# Patient Record
Sex: FEMALE | Race: WHITE | HISPANIC OR LATINO | Employment: FULL TIME | URBAN - METROPOLITAN AREA
[De-identification: names, ages, dates, MRNs, and addresses within clinical notes are randomized per-mention and may not be internally consistent; named-entity substitution may affect disease eponyms.]

---

## 2019-03-29 ENCOUNTER — NON-PROVIDER VISIT (OUTPATIENT)
Dept: HEALTH INFORMATION MANAGEMENT | Facility: MEDICAL CENTER | Age: 29
End: 2019-03-29
Payer: COMMERCIAL

## 2019-03-29 VITALS — HEIGHT: 68 IN | WEIGHT: 214.4 LBS | BODY MASS INDEX: 32.49 KG/M2

## 2019-03-29 DIAGNOSIS — O24.419 GESTATIONAL DIABETES MELLITUS (GDM) IN THIRD TRIMESTER, GESTATIONAL DIABETES METHOD OF CONTROL UNSPECIFIED: ICD-10-CM

## 2019-03-29 PROCEDURE — G0109 DIAB MANAGE TRN IND/GROUP: HCPCS | Performed by: INTERNAL MEDICINE

## 2019-03-29 NOTE — LETTER
2019                   Re: Marycarmen Retana     1990         [id number]       Rabia Cervantes M.D.  [address] [place] [address]      Dear :Rabia Cervantes M.D.    On 3/29/2019, your patient Marycarmen Retana, received 1 hour of nurse training from the [facility] at [facility] for management of her gestational diabetes.  Her Estimated Date of Delivery: 19 with scheduled  for 19.  We taught the following subjects:    Introduction to gestational diabetes, benefits and responsibilities of patient, physiology of diabetes and the diease process, benefits of blood glucose monitoring and record keeping, medication action and possible side effects, hypoglycemia, sick day management, exercise, and stress reduction.       Nurse assessment / Education:    Comments:    Edema:no      Weight:Weight: 97.3 kg (214 lb 6.4 oz)         Complaints:yes - History of Gastric Bypass and having twins.      Pathophysiology of diabetes in pregnancy    Discuss  potential maternal and fetal complications in pregnancy with diabetes.     Importance of blood glucose monitoring   Proper testing technique using a One Touch Verio Flex meter.    At 2:20 pm, the meter read 79, which was 7.5 hours after eating.  Testing: fasting and one hour after meals,  expected ranges and rationale for strict control.     Ketone test today:no       Recognition and treatment of hypoglycemia.     Insulin taught: No  Insulin briefly dicussed at this time.    Should patient require insulin later in pregnancy, she would need further education.     Reviewed fetal kick counts and other tests to determine fetal well-being  Discuss benefits and risks of exercise in pregnancy  Discuss when to call Doctor  Discuss sick day care        Patient/caregiver appeared to understand the content as demonstrated by appropriate questions.     Marycarmen Retana was encouraged to discuss this further with you.    Hopefully this will help in  your management of her care.  If we can be of further assistance, please feel free to call.    Thank you for the referral.    Sincerely,      Alayna Lindsay RN CDE  GDM CLASS  Certified Diabetes Educator

## 2019-03-29 NOTE — PROGRESS NOTES
Marycarmen received a 2000 calorie meal plan (based on 20 kcal/kg of current weight; with consideration for bariatric surgery, multigravid pregnancy) consisting of 3 meals and 3 snacks (see media for copy of meal plan). Calories may need to be adjusted based on weight gain and how much she can tolerated given hx of bariatric surgery.   Pt states she eats small frequent meals already and will continue to do so. However today hasnt eaten since breakfast.     Pt's prepregnancy weight was: 198lb. She has gained 16lb so far in this pregnancy. Recommended weight gain is 25-42lb total.   Pt had gastric bypass surgery; highest weight was 284lb; lost ~100lb before she got pregnant.     Pt was educated on carbohydrate containing foods vs non carbohydrate containing foods, the importance of small frequent meals, limiting carbohydrate to 1 serving in the morning, no fruit before noon/12pm, and avoiding all concentrated sweets for the remainder of her pregnancy. Explained the importance of not going >4hrs btwn eating during the day and no longer than 10hours overnight. Patient agrees to follow the meal plan and guidelines provided.

## 2019-03-29 NOTE — LETTER
March 29, 2019             Re: Marycarmen Retana     1990         Rabia Cervantes M.D.  [address] [place] [address]      Dear :Rabia Cervantes M.D.    On 3/29/2019, your patient Marycarmen Retana, received 1 hours of nutrition training from the [facility] at [facility] for management of her gestational diabetes.  Her EDC is Estimated Date of Delivery: 6/4/19.  We taught the following subjects:    Patient was provided with a 2000 calorie meal plan with 3 meals and 3 snacks based on 20kcal/kg w/ consideration for bariatric surgery and twins. This will likely need to be adjusted based on weight gain and tolerance to food volume.   Pt was likely eating 1200kcal diet after surgery so it may be difficult for her to consume adequate calories s/p bariatric surgery. Her weight gain however has been adequate thus far in her pregnancy.     Meal plan contains 195 grams of carbohydrates per day.   Emphasis was placed on protein and small frequent meals.  Discussed the importance of meal planning in diabetes management during pregnancy  Importance of consistent timing of meals and snacks and agreed upon times  Avoidance of simple carbohydrates  Metabolism of food components relating to pregnancy  Identification of foods in food groups  Patient demonstrates adequate ability to utilize meal planning manual for reference  Plan 3 meals and 3 snacks with 90% accuracy  Review basic principles of eating out  Reviewed precautions with artificial sweeteners    Comments:  Marycarmen agreed to follow the meal plan and to eat at the times agreed upon.  She will check blood glucose 4 times a day and record the values in her log book.    Marycarmen Retana was encouraged to discuss this further with you.    Hopefully this will help in your management of her care.  If we can be of further assistance, please feel free to call.    Thank you for the referral.  Sincerely,  Aleyda Thompson, RD, LD, CDE  [phone]

## 2019-03-29 NOTE — PROGRESS NOTES
Marycarmen came to the Gestational Diabetes.  She states her  has Type 1 diabetes.  She has a history of Gastric Bypass and lost 100 pounds.  She is having twins.  She was not able to do the Glucose Tolerance Test due to the Gastric Bypass.  She has a scheduled  for 19.  She was supplied an One Touch Verio Flex meter.  She was able to do a return demonstration without difficulty. She will keep walking and stay well hydrated with water. Her meal plan is scanned into Media and her Doctor Letters with what was taught can be found under the Letters tab.

## 2019-04-17 ENCOUNTER — HOSPITAL ENCOUNTER (OUTPATIENT)
Facility: MEDICAL CENTER | Age: 29
End: 2019-04-17
Attending: OBSTETRICS & GYNECOLOGY | Admitting: OBSTETRICS & GYNECOLOGY
Payer: COMMERCIAL

## 2019-04-17 VITALS
DIASTOLIC BLOOD PRESSURE: 69 MMHG | SYSTOLIC BLOOD PRESSURE: 106 MMHG | HEART RATE: 88 BPM | RESPIRATION RATE: 18 BRPM | TEMPERATURE: 98.5 F | HEIGHT: 68 IN | BODY MASS INDEX: 32.58 KG/M2 | WEIGHT: 215 LBS

## 2019-04-17 LAB
APPEARANCE UR: CLEAR
COLOR UR AUTO: YELLOW
GLUCOSE UR QL STRIP.AUTO: NEGATIVE MG/DL
KETONES UR QL STRIP.AUTO: NEGATIVE MG/DL
LEUKOCYTE ESTERASE UR QL STRIP.AUTO: ABNORMAL
NITRITE UR QL STRIP.AUTO: NEGATIVE
PH UR STRIP.AUTO: 6 [PH]
PROT UR QL STRIP: NEGATIVE MG/DL
RBC UR QL AUTO: NEGATIVE
SP GR UR: <=1.005

## 2019-04-17 PROCEDURE — 59025 FETAL NON-STRESS TEST: CPT

## 2019-04-17 PROCEDURE — 81002 URINALYSIS NONAUTO W/O SCOPE: CPT

## 2019-04-18 NOTE — DISCHARGE INSTRUCTIONS
General Instructions:  · If you think you are in labor, time contractions (lying on your left side) from the beginning of one contraction to the beginning of the next contraction for at least one hour.  · Increase fluid intake: you should consume 10-12 8 oz glasses of non-caffeinated fluid per day.  · Report any pressure or burning on urination to your physician.  · Monitor fetal movement: If you notice an absence or decrease in fetal movement, drink a large glass of water and rest on your side.  If there is no increase in movement, call your physician or go to the hospital for further evaluation.  · Report any sudden, sharp abdominal pain.  · Report any bleeding.  Spotting or pinkish discharge is normal after vaginal exam.  You may also spot after sexual intercourse.    Pre-term Labor (<37 weeks):  Call your physician or return to the hospital if:  · You have painless regular contractions more than 4 in one hour.  · Your water breaks (remember time and color).  · You have menstrual-like cramps, a low dull backache or pressure in your pelvis or back.  · Your baby does not move enough to complete the daily kick count (10 movements in 2 hours).  · Your baby moves much less often than on the days before or you have not felt your baby move all day.  · Please review the MEDICATION LIST section of your AFTER VISIT SUMMARY document.  · Take your medication as prescribed    High Blood Pressure:  · Rest on your right or left side.  · Report any severe headaches, dizziness, blurred vision or spots before your eyes.  · Report excessive swelling of your hands, face or feet.  · Report epigastric pain (upper abdominal pain)      Other Instructions:  Please carefully review your entire AFTER VISIT SUMMARY document for all discharge instructions.

## 2019-04-18 NOTE — PROGRESS NOTES
" EDC 19=33.1 twin gestation.     182 pt arrived to L&D after calling Dr. Cervantes's office saying that she \"has bad swelling in her feet that is new and has a mild HA, pt states she is pregnant with twins and due in \". Pt taken to LDA3, EFM (X2) and TOCO applied, POC discussed, all questions answered. Initial BP WNL. Pt reprots +FM, denies leaking of vaginal fluid or blood, denies feeling cramping or contractions.   184 Dr. Matos in department, updated on pt status, orders received to run PIH labs if protein in urine or increase in BP, will  continue with POC.   bedside report given to NOC RN, assumed care, POC discussed, all questions answered. VSS.  "

## 2019-04-18 NOTE — PROGRESS NOTES
Report received from MACIE Rasmussen RN. POC discussed.     Pt is a ; YOLANDA of 6/; making her 33w1d. Pregnant with twin gestation. Pt here for generalized swelling in her lower extremeties. Denies HA, Blurry vision, RUQ pain, UCs, VB and reports +FM.     POC to collected POC UA. Cycle BPs and update MD. BPs WNL, UA collected (see results). Dr Matos updated. Orders to discharge pt home.     General discharge instruction, PTL precautions, high blood pressure warning signs and follow up discussed with pt and FOB. All questions answered at this time. Pt signed discharge paperwork and ambulated out in stable condition with FOB at side.

## 2019-04-22 ENCOUNTER — APPOINTMENT (OUTPATIENT)
Dept: RADIOLOGY | Facility: MEDICAL CENTER | Age: 29
End: 2019-04-22
Attending: OBSTETRICS & GYNECOLOGY
Payer: COMMERCIAL

## 2019-04-22 ENCOUNTER — ANESTHESIA EVENT (OUTPATIENT)
Dept: OBGYN | Facility: MEDICAL CENTER | Age: 29
End: 2019-04-22
Payer: COMMERCIAL

## 2019-04-22 ENCOUNTER — HOSPITAL ENCOUNTER (INPATIENT)
Facility: MEDICAL CENTER | Age: 29
LOS: 4 days | End: 2019-04-26
Attending: OBSTETRICS & GYNECOLOGY | Admitting: OBSTETRICS & GYNECOLOGY
Payer: COMMERCIAL

## 2019-04-22 LAB
ABO GROUP BLD: NORMAL
ANISOCYTOSIS BLD QL SMEAR: ABNORMAL
BASOPHILS # BLD AUTO: 0.5 % (ref 0–1.8)
BASOPHILS # BLD: 0.05 K/UL (ref 0–0.12)
COMMENT 1642: NORMAL
EOSINOPHIL # BLD AUTO: 0.03 K/UL (ref 0–0.51)
EOSINOPHIL NFR BLD: 0.3 % (ref 0–6.9)
ERYTHROCYTE [DISTWIDTH] IN BLOOD BY AUTOMATED COUNT: 49.9 FL (ref 35.9–50)
ERYTHROCYTE [DISTWIDTH] IN BLOOD BY AUTOMATED COUNT: 50.6 FL (ref 35.9–50)
HCT VFR BLD AUTO: 31.1 % (ref 37–47)
HCT VFR BLD AUTO: 31.4 % (ref 37–47)
HGB BLD-MCNC: 9.3 G/DL (ref 12–16)
HGB BLD-MCNC: 9.3 G/DL (ref 12–16)
HOLDING TUBE BB 8507: NORMAL
IMM GRANULOCYTES # BLD AUTO: 0.05 K/UL (ref 0–0.11)
IMM GRANULOCYTES NFR BLD AUTO: 0.5 % (ref 0–0.9)
IMMUNE ROSETTING TEST 8505FMH: NORMAL
LG PLATELETS BLD QL SMEAR: NORMAL
LYMPHOCYTES # BLD AUTO: 2 K/UL (ref 1–4.8)
LYMPHOCYTES NFR BLD: 20.7 % (ref 22–41)
MCH RBC QN AUTO: 24 PG (ref 27–33)
MCH RBC QN AUTO: 24 PG (ref 27–33)
MCHC RBC AUTO-ENTMCNC: 29.6 G/DL (ref 33.6–35)
MCHC RBC AUTO-ENTMCNC: 29.9 G/DL (ref 33.6–35)
MCV RBC AUTO: 80.2 FL (ref 81.4–97.8)
MCV RBC AUTO: 81.1 FL (ref 81.4–97.8)
MICROCYTES BLD QL SMEAR: ABNORMAL
MONOCYTES # BLD AUTO: 0.4 K/UL (ref 0–0.85)
MONOCYTES NFR BLD AUTO: 4.1 % (ref 0–13.4)
MORPHOLOGY BLD-IMP: NORMAL
MORPHOLOGY BLD-IMP: NORMAL
NEUTROPHILS # BLD AUTO: 7.11 K/UL (ref 2–7.15)
NEUTROPHILS NFR BLD: 73.9 % (ref 44–72)
NRBC # BLD AUTO: 0 K/UL
NRBC BLD-RTO: 0 /100 WBC
NUMBER OF RH DOSES IND 8505RD: 1
OVALOCYTES BLD QL SMEAR: NORMAL
PLATELET # BLD AUTO: 194 K/UL (ref 164–446)
PLATELET # BLD AUTO: 204 K/UL (ref 164–446)
PLATELET BLD QL SMEAR: NORMAL
PMV BLD AUTO: 11.6 FL (ref 9–12.9)
PMV BLD AUTO: 11.6 FL (ref 9–12.9)
POIKILOCYTOSIS BLD QL SMEAR: NORMAL
RBC # BLD AUTO: 3.87 M/UL (ref 4.2–5.4)
RBC # BLD AUTO: 3.88 M/UL (ref 4.2–5.4)
RBC BLD AUTO: PRESENT
RH BLD: NORMAL
VARIANT LYMPHS BLD QL SMEAR: NORMAL
WBC # BLD AUTO: 10.7 K/UL (ref 4.8–10.8)
WBC # BLD AUTO: 9.6 K/UL (ref 4.8–10.8)

## 2019-04-22 PROCEDURE — 700102 HCHG RX REV CODE 250 W/ 637 OVERRIDE(OP)

## 2019-04-22 PROCEDURE — 85027 COMPLETE CBC AUTOMATED: CPT

## 2019-04-22 PROCEDURE — 87150 DNA/RNA AMPLIFIED PROBE: CPT

## 2019-04-22 PROCEDURE — 700102 HCHG RX REV CODE 250 W/ 637 OVERRIDE(OP): Performed by: ANESTHESIOLOGY

## 2019-04-22 PROCEDURE — 76815 OB US LIMITED FETUS(S): CPT

## 2019-04-22 PROCEDURE — 85461 HEMOGLOBIN FETAL: CPT

## 2019-04-22 PROCEDURE — 305385 HCHG SURGICAL SERVICES 1/4 HOUR: Performed by: OBSTETRICS & GYNECOLOGY

## 2019-04-22 PROCEDURE — 700111 HCHG RX REV CODE 636 W/ 250 OVERRIDE (IP): Performed by: ANESTHESIOLOGY

## 2019-04-22 PROCEDURE — 86900 BLOOD TYPING SEROLOGIC ABO: CPT

## 2019-04-22 PROCEDURE — A9270 NON-COVERED ITEM OR SERVICE: HCPCS | Performed by: OBSTETRICS & GYNECOLOGY

## 2019-04-22 PROCEDURE — 700105 HCHG RX REV CODE 258: Performed by: ANESTHESIOLOGY

## 2019-04-22 PROCEDURE — 700111 HCHG RX REV CODE 636 W/ 250 OVERRIDE (IP)

## 2019-04-22 PROCEDURE — 88307 TISSUE EXAM BY PATHOLOGIST: CPT | Mod: 59

## 2019-04-22 PROCEDURE — 700101 HCHG RX REV CODE 250: Performed by: ANESTHESIOLOGY

## 2019-04-22 PROCEDURE — 304966 HCHG RECOVERY SVSC TIME ADDL 1/2 HR: Performed by: OBSTETRICS & GYNECOLOGY

## 2019-04-22 PROCEDURE — 59514 CESAREAN DELIVERY ONLY: CPT

## 2019-04-22 PROCEDURE — 86901 BLOOD TYPING SEROLOGIC RH(D): CPT

## 2019-04-22 PROCEDURE — 85025 COMPLETE CBC W/AUTO DIFF WBC: CPT

## 2019-04-22 PROCEDURE — 36415 COLL VENOUS BLD VENIPUNCTURE: CPT

## 2019-04-22 PROCEDURE — 306828 HCHG ANES-TIME GENERAL: Performed by: OBSTETRICS & GYNECOLOGY

## 2019-04-22 PROCEDURE — A9270 NON-COVERED ITEM OR SERVICE: HCPCS | Performed by: ANESTHESIOLOGY

## 2019-04-22 PROCEDURE — 304964 HCHG RECOVERY ROOM TIME 1HR: Performed by: OBSTETRICS & GYNECOLOGY

## 2019-04-22 PROCEDURE — 700111 HCHG RX REV CODE 636 W/ 250 OVERRIDE (IP): Performed by: OBSTETRICS & GYNECOLOGY

## 2019-04-22 PROCEDURE — 3E0334Z INTRODUCTION OF SERUM, TOXOID AND VACCINE INTO PERIPHERAL VEIN, PERCUTANEOUS APPROACH: ICD-10-PCS | Performed by: OBSTETRICS & GYNECOLOGY

## 2019-04-22 PROCEDURE — 700105 HCHG RX REV CODE 258

## 2019-04-22 PROCEDURE — 87081 CULTURE SCREEN ONLY: CPT

## 2019-04-22 PROCEDURE — 770002 HCHG ROOM/CARE - OB PRIVATE (112)

## 2019-04-22 PROCEDURE — 700102 HCHG RX REV CODE 250 W/ 637 OVERRIDE(OP): Performed by: OBSTETRICS & GYNECOLOGY

## 2019-04-22 RX ORDER — METOCLOPRAMIDE HYDROCHLORIDE 5 MG/ML
10 INJECTION INTRAMUSCULAR; INTRAVENOUS ONCE
Status: DISCONTINUED | OUTPATIENT
Start: 2019-04-22 | End: 2019-04-22 | Stop reason: HOSPADM

## 2019-04-22 RX ORDER — OXYCODONE HYDROCHLORIDE 5 MG/1
5 TABLET ORAL EVERY 4 HOURS PRN
Status: ACTIVE | OUTPATIENT
Start: 2019-04-22 | End: 2019-04-23

## 2019-04-22 RX ORDER — ACETAMINOPHEN 500 MG
1000 TABLET ORAL EVERY 6 HOURS
Status: COMPLETED | OUTPATIENT
Start: 2019-04-22 | End: 2019-04-23

## 2019-04-22 RX ORDER — ONDANSETRON 2 MG/ML
INJECTION INTRAMUSCULAR; INTRAVENOUS PRN
Status: DISCONTINUED | OUTPATIENT
Start: 2019-04-22 | End: 2019-04-22 | Stop reason: SURG

## 2019-04-22 RX ORDER — CITRIC ACID/SODIUM CITRATE 334-500MG
SOLUTION, ORAL ORAL
Status: COMPLETED
Start: 2019-04-22 | End: 2019-04-22

## 2019-04-22 RX ORDER — MORPHINE SULFATE 0.5 MG/ML
INJECTION, SOLUTION EPIDURAL; INTRATHECAL; INTRAVENOUS PRN
Status: DISCONTINUED | OUTPATIENT
Start: 2019-04-22 | End: 2019-04-22 | Stop reason: SURG

## 2019-04-22 RX ORDER — DEXAMETHASONE SODIUM PHOSPHATE 4 MG/ML
INJECTION, SOLUTION INTRA-ARTICULAR; INTRALESIONAL; INTRAMUSCULAR; INTRAVENOUS; SOFT TISSUE PRN
Status: DISCONTINUED | OUTPATIENT
Start: 2019-04-22 | End: 2019-04-22 | Stop reason: SURG

## 2019-04-22 RX ORDER — BUPIVACAINE HYDROCHLORIDE 7.5 MG/ML
INJECTION, SOLUTION INTRASPINAL PRN
Status: DISCONTINUED | OUTPATIENT
Start: 2019-04-22 | End: 2019-04-22 | Stop reason: SURG

## 2019-04-22 RX ORDER — ONDANSETRON 2 MG/ML
4 INJECTION INTRAMUSCULAR; INTRAVENOUS EVERY 6 HOURS PRN
Status: ACTIVE | OUTPATIENT
Start: 2019-04-22 | End: 2019-04-23

## 2019-04-22 RX ORDER — NIFEDIPINE 10 MG/1
20 CAPSULE ORAL
Status: DISCONTINUED | OUTPATIENT
Start: 2019-04-22 | End: 2019-04-22

## 2019-04-22 RX ORDER — DIPHENHYDRAMINE HYDROCHLORIDE 50 MG/ML
12.5 INJECTION INTRAMUSCULAR; INTRAVENOUS EVERY 6 HOURS PRN
Status: ACTIVE | OUTPATIENT
Start: 2019-04-22 | End: 2019-04-23

## 2019-04-22 RX ORDER — PENICILLIN G POTASSIUM 5000000 [IU]/1
5 INJECTION, POWDER, FOR SOLUTION INTRAMUSCULAR; INTRAVENOUS ONCE
Status: COMPLETED | OUTPATIENT
Start: 2019-04-22 | End: 2019-04-22

## 2019-04-22 RX ORDER — SODIUM CHLORIDE 9 MG/ML
INJECTION, SOLUTION INTRAVENOUS
Status: COMPLETED
Start: 2019-04-22 | End: 2019-04-22

## 2019-04-22 RX ORDER — METHYLERGONOVINE MALEATE 0.2 MG/ML
0.2 INJECTION INTRAVENOUS
Status: DISCONTINUED | OUTPATIENT
Start: 2019-04-22 | End: 2019-04-26 | Stop reason: HOSPADM

## 2019-04-22 RX ORDER — HYDROMORPHONE HYDROCHLORIDE 1 MG/ML
0.2 INJECTION, SOLUTION INTRAMUSCULAR; INTRAVENOUS; SUBCUTANEOUS
Status: ACTIVE | OUTPATIENT
Start: 2019-04-22 | End: 2019-04-23

## 2019-04-22 RX ORDER — SODIUM CHLORIDE, SODIUM LACTATE, POTASSIUM CHLORIDE, CALCIUM CHLORIDE 600; 310; 30; 20 MG/100ML; MG/100ML; MG/100ML; MG/100ML
INJECTION, SOLUTION INTRAVENOUS
Status: COMPLETED
Start: 2019-04-22 | End: 2019-04-22

## 2019-04-22 RX ORDER — ONDANSETRON 2 MG/ML
4 INJECTION INTRAMUSCULAR; INTRAVENOUS
Status: CANCELLED | OUTPATIENT
Start: 2019-04-22

## 2019-04-22 RX ORDER — DIPHENHYDRAMINE HYDROCHLORIDE 50 MG/ML
12.5 INJECTION INTRAMUSCULAR; INTRAVENOUS
Status: CANCELLED | OUTPATIENT
Start: 2019-04-22

## 2019-04-22 RX ORDER — HALOPERIDOL 5 MG/ML
1 INJECTION INTRAMUSCULAR
Status: CANCELLED | OUTPATIENT
Start: 2019-04-22

## 2019-04-22 RX ORDER — OXYCODONE HYDROCHLORIDE 10 MG/1
10 TABLET ORAL EVERY 4 HOURS PRN
Status: ACTIVE | OUTPATIENT
Start: 2019-04-22 | End: 2019-04-23

## 2019-04-22 RX ORDER — MEPERIDINE HYDROCHLORIDE 25 MG/ML
6.25 INJECTION INTRAMUSCULAR; INTRAVENOUS; SUBCUTANEOUS
Status: CANCELLED | OUTPATIENT
Start: 2019-04-22

## 2019-04-22 RX ORDER — CITRIC ACID/SODIUM CITRATE 334-500MG
30 SOLUTION, ORAL ORAL ONCE
Status: DISCONTINUED | OUTPATIENT
Start: 2019-04-22 | End: 2019-04-22 | Stop reason: HOSPADM

## 2019-04-22 RX ORDER — SODIUM CHLORIDE, SODIUM GLUCONATE, SODIUM ACETATE, POTASSIUM CHLORIDE AND MAGNESIUM CHLORIDE 526; 502; 368; 37; 30 MG/100ML; MG/100ML; MG/100ML; MG/100ML; MG/100ML
1500 INJECTION, SOLUTION INTRAVENOUS ONCE
Status: COMPLETED | OUTPATIENT
Start: 2019-04-22 | End: 2019-04-22

## 2019-04-22 RX ORDER — KETOROLAC TROMETHAMINE 30 MG/ML
30 INJECTION, SOLUTION INTRAMUSCULAR; INTRAVENOUS EVERY 6 HOURS
Status: COMPLETED | OUTPATIENT
Start: 2019-04-22 | End: 2019-04-23

## 2019-04-22 RX ORDER — METOCLOPRAMIDE HYDROCHLORIDE 5 MG/ML
INJECTION INTRAMUSCULAR; INTRAVENOUS
Status: COMPLETED
Start: 2019-04-22 | End: 2019-04-22

## 2019-04-22 RX ORDER — NIFEDIPINE 10 MG/1
5 CAPSULE ORAL
Status: DISCONTINUED | OUTPATIENT
Start: 2019-04-22 | End: 2019-04-22

## 2019-04-22 RX ORDER — DOCUSATE SODIUM 100 MG/1
100 CAPSULE, LIQUID FILLED ORAL 2 TIMES DAILY PRN
Status: DISCONTINUED | OUTPATIENT
Start: 2019-04-22 | End: 2019-04-26 | Stop reason: HOSPADM

## 2019-04-22 RX ORDER — METOCLOPRAMIDE HYDROCHLORIDE 5 MG/ML
10 INJECTION INTRAMUSCULAR; INTRAVENOUS ONCE
Status: COMPLETED | OUTPATIENT
Start: 2019-04-22 | End: 2019-04-22

## 2019-04-22 RX ORDER — HYDROMORPHONE HYDROCHLORIDE 1 MG/ML
0.4 INJECTION, SOLUTION INTRAMUSCULAR; INTRAVENOUS; SUBCUTANEOUS
Status: ACTIVE | OUTPATIENT
Start: 2019-04-22 | End: 2019-04-23

## 2019-04-22 RX ORDER — SODIUM CHLORIDE, SODIUM LACTATE, POTASSIUM CHLORIDE, CALCIUM CHLORIDE 600; 310; 30; 20 MG/100ML; MG/100ML; MG/100ML; MG/100ML
INJECTION, SOLUTION INTRAVENOUS PRN
Status: DISCONTINUED | OUTPATIENT
Start: 2019-04-22 | End: 2019-04-26 | Stop reason: HOSPADM

## 2019-04-22 RX ORDER — BETAMETHASONE SODIUM PHOSPHATE AND BETAMETHASONE ACETATE 3; 3 MG/ML; MG/ML
12 INJECTION, SUSPENSION INTRA-ARTICULAR; INTRALESIONAL; INTRAMUSCULAR; SOFT TISSUE EVERY 24 HOURS
Status: DISCONTINUED | OUTPATIENT
Start: 2019-04-22 | End: 2019-04-22

## 2019-04-22 RX ORDER — MISOPROSTOL 200 UG/1
600 TABLET ORAL
Status: DISCONTINUED | OUTPATIENT
Start: 2019-04-22 | End: 2019-04-26 | Stop reason: HOSPADM

## 2019-04-22 RX ORDER — DIPHENHYDRAMINE HYDROCHLORIDE 50 MG/ML
25 INJECTION INTRAMUSCULAR; INTRAVENOUS EVERY 6 HOURS PRN
Status: ACTIVE | OUTPATIENT
Start: 2019-04-22 | End: 2019-04-23

## 2019-04-22 RX ORDER — CARBOPROST TROMETHAMINE 250 UG/ML
250 INJECTION, SOLUTION INTRAMUSCULAR
Status: DISCONTINUED | OUTPATIENT
Start: 2019-04-22 | End: 2019-04-26 | Stop reason: HOSPADM

## 2019-04-22 RX ORDER — SODIUM CHLORIDE, SODIUM LACTATE, POTASSIUM CHLORIDE, CALCIUM CHLORIDE 600; 310; 30; 20 MG/100ML; MG/100ML; MG/100ML; MG/100ML
INJECTION, SOLUTION INTRAVENOUS CONTINUOUS
Status: CANCELLED | OUTPATIENT
Start: 2019-04-22

## 2019-04-22 RX ORDER — CEFAZOLIN SODIUM 1 G/3ML
INJECTION, POWDER, FOR SOLUTION INTRAMUSCULAR; INTRAVENOUS PRN
Status: DISCONTINUED | OUTPATIENT
Start: 2019-04-22 | End: 2019-04-22 | Stop reason: SURG

## 2019-04-22 RX ORDER — CITRIC ACID/SODIUM CITRATE 334-500MG
30 SOLUTION, ORAL ORAL ONCE
Status: COMPLETED | OUTPATIENT
Start: 2019-04-22 | End: 2019-04-22

## 2019-04-22 RX ORDER — SODIUM CHLORIDE, SODIUM LACTATE, POTASSIUM CHLORIDE, CALCIUM CHLORIDE 600; 310; 30; 20 MG/100ML; MG/100ML; MG/100ML; MG/100ML
INJECTION, SOLUTION INTRAVENOUS CONTINUOUS
Status: DISCONTINUED | OUTPATIENT
Start: 2019-04-22 | End: 2019-04-22

## 2019-04-22 RX ADMIN — ONDANSETRON 4 MG: 2 INJECTION INTRAMUSCULAR; INTRAVENOUS at 14:55

## 2019-04-22 RX ADMIN — METOCLOPRAMIDE HYDROCHLORIDE 10 MG: 5 INJECTION INTRAMUSCULAR; INTRAVENOUS at 14:01

## 2019-04-22 RX ADMIN — METOCLOPRAMIDE 10 MG: 5 INJECTION, SOLUTION INTRAMUSCULAR; INTRAVENOUS at 14:01

## 2019-04-22 RX ADMIN — DEXAMETHASONE SODIUM PHOSPHATE 4 MG: 4 INJECTION, SOLUTION INTRA-ARTICULAR; INTRALESIONAL; INTRAMUSCULAR; INTRAVENOUS; SOFT TISSUE at 15:00

## 2019-04-22 RX ADMIN — FENTANYL CITRATE 100 MCG: 50 INJECTION, SOLUTION INTRAMUSCULAR; INTRAVENOUS at 13:53

## 2019-04-22 RX ADMIN — SODIUM CHLORIDE, SODIUM GLUCONATE, SODIUM ACETATE, POTASSIUM CHLORIDE AND MAGNESIUM CHLORIDE: 526; 502; 368; 37; 30 INJECTION, SOLUTION INTRAVENOUS at 14:48

## 2019-04-22 RX ADMIN — BUPIVACAINE HYDROCHLORIDE IN DEXTROSE 2 ML: 7.5 INJECTION, SOLUTION SUBARACHNOID at 14:55

## 2019-04-22 RX ADMIN — EPHEDRINE SULFATE 25 MG: 50 INJECTION INTRAMUSCULAR; INTRAVENOUS; SUBCUTANEOUS at 15:05

## 2019-04-22 RX ADMIN — SODIUM CHLORIDE 100 ML: 900 INJECTION INTRAVENOUS at 11:48

## 2019-04-22 RX ADMIN — FENTANYL CITRATE 80 MCG: 50 INJECTION, SOLUTION INTRAMUSCULAR; INTRAVENOUS at 15:20

## 2019-04-22 RX ADMIN — FENTANYL CITRATE 20 MCG: 50 INJECTION, SOLUTION INTRAMUSCULAR; INTRAVENOUS at 14:55

## 2019-04-22 RX ADMIN — EPHEDRINE SULFATE 25 MG: 50 INJECTION INTRAMUSCULAR; INTRAVENOUS; SUBCUTANEOUS at 15:00

## 2019-04-22 RX ADMIN — SODIUM CHLORIDE, SODIUM LACTATE, POTASSIUM CHLORIDE, CALCIUM CHLORIDE 1000 ML: 600; 310; 30; 20 INJECTION, SOLUTION INTRAVENOUS at 11:56

## 2019-04-22 RX ADMIN — OXYTOCIN 20 ML: 10 INJECTION, SOLUTION INTRAMUSCULAR; INTRAVENOUS at 15:15

## 2019-04-22 RX ADMIN — PENICILLIN G POTASSIUM 5 MILLION UNITS: 5000000 POWDER, FOR SOLUTION INTRAMUSCULAR; INTRAPLEURAL; INTRATHECAL; INTRAVENOUS at 11:48

## 2019-04-22 RX ADMIN — ACETAMINOPHEN 1000 MG: 500 TABLET ORAL at 18:28

## 2019-04-22 RX ADMIN — KETOROLAC TROMETHAMINE 30 MG: 30 INJECTION, SOLUTION INTRAMUSCULAR at 18:31

## 2019-04-22 RX ADMIN — BETAMETHASONE SODIUM PHOSPHATE AND BETAMETHASONE ACETATE 12 MG: 3; 3 INJECTION, SUSPENSION INTRA-ARTICULAR; INTRALESIONAL; INTRAMUSCULAR at 13:02

## 2019-04-22 RX ADMIN — NIFEDIPINE 20 MG: 10 CAPSULE ORAL at 11:55

## 2019-04-22 RX ADMIN — MORPHINE SULFATE 0.15 MG: 0.5 INJECTION, SOLUTION EPIDURAL; INTRATHECAL; INTRAVENOUS at 14:55

## 2019-04-22 RX ADMIN — Medication 30 ML: at 14:04

## 2019-04-22 RX ADMIN — FAMOTIDINE 20 MG: 10 INJECTION INTRAVENOUS at 14:01

## 2019-04-22 RX ADMIN — SODIUM CHLORIDE, POTASSIUM CHLORIDE, SODIUM LACTATE AND CALCIUM CHLORIDE 1000 ML: 600; 310; 30; 20 INJECTION, SOLUTION INTRAVENOUS at 11:56

## 2019-04-22 RX ADMIN — SODIUM CHLORIDE, SODIUM GLUCONATE, SODIUM ACETATE, POTASSIUM CHLORIDE AND MAGNESIUM CHLORIDE 1000 ML: 526; 502; 368; 37; 30 INJECTION, SOLUTION INTRAVENOUS at 14:31

## 2019-04-22 RX ADMIN — SODIUM CHLORIDE, SODIUM GLUCONATE, SODIUM ACETATE, POTASSIUM CHLORIDE AND MAGNESIUM CHLORIDE: 526; 502; 368; 37; 30 INJECTION, SOLUTION INTRAVENOUS at 15:10

## 2019-04-22 RX ADMIN — SODIUM CHLORIDE, SODIUM GLUCONATE, SODIUM ACETATE, POTASSIUM CHLORIDE AND MAGNESIUM CHLORIDE: 526; 502; 368; 37; 30 INJECTION, SOLUTION INTRAVENOUS at 15:40

## 2019-04-22 RX ADMIN — CEFAZOLIN 2 G: 330 INJECTION, POWDER, FOR SOLUTION INTRAMUSCULAR; INTRAVENOUS at 14:50

## 2019-04-22 RX ADMIN — SODIUM CITRATE AND CITRIC ACID MONOHYDRATE 30 ML: 500; 334 SOLUTION ORAL at 14:04

## 2019-04-22 ASSESSMENT — PATIENT HEALTH QUESTIONNAIRE - PHQ9
1. LITTLE INTEREST OR PLEASURE IN DOING THINGS: NOT AT ALL
2. FEELING DOWN, DEPRESSED, IRRITABLE, OR HOPELESS: NOT AT ALL
SUM OF ALL RESPONSES TO PHQ9 QUESTIONS 1 AND 2: 0

## 2019-04-22 ASSESSMENT — PAIN SCALES - GENERAL: PAIN_LEVEL: 0

## 2019-04-22 ASSESSMENT — LIFESTYLE VARIABLES: EVER_SMOKED: NEVER

## 2019-04-22 NOTE — ANESTHESIA TIME REPORT
Anesthesia Start and Stop Event Times     Date Time Event    2019 1448 Anesthesia Start     1555 Anesthesia Stop        Responsible Staff  19    Name Role Begin End    Mao Lawler D.O. Anesth 1448 1555        Preop Diagnosis (Free Text):  Pre-op Diagnosis     TWINS, 37 WEEKS        Preop Diagnosis (Codes):  Diagnosis Information     Diagnosis Code(s):         Post op Diagnosis  Pregnancy   labor    Premium Reason  A. 3PM - 7AM    Comments:

## 2019-04-22 NOTE — ANESTHESIA QCDR
2019 [facility] Clinical Data Registry (for Quality Improvement)     Postoperative nausea/vomiting risk protocol (Adult = 18 yrs and Pediatric 3-17 yrs)- (430 and 463)  General inhalation anesthetic (NOT TIVA) with PONV risk factors: No  Provision of anti-emetic therapy with at least 2 different classes of agents: N/A  Patient DID NOT receive anti-emetic therapy and reason is documented in Medical Record: N/A    Multimodal Pain Management- (AQI59)  Patient undergoing Elective Surgery (i.e. Outpatient, or ASC, or Prescheduled Surgery prior to Hospital Admission): No  Use of Multimodal Pain Management, two or more drugs and/or interventions, NOT including systemic opioids: N/A  Exception: Documented allergy to multiple classes of analgesics: N/A    PACU assessment of acute postoperative pain prior to Anesthesia Care End- Applies to Patients Age = 18- (ABG7)  Initial PACU pain score is which of the following: < 7/10  Patient unable to report pain score: N/A    Post-anesthetic transfer of care checklist/protocol to PACU/ICU- (426 and 427)  Upon conclusion of case, patient transferred to which of the following locations: PACU/Non-ICU  Use of transfer checklist/protocol: Yes  Exclusion: Service Performed in Patient Hospital Room (and thus did not require transfer): N/A    PACU Reintubation- (AQI31)  General anesthesia requiring endotracheal intubation (ETT) along with subsequent extubation in OR or PACU: No  Required reintubation in the PACU: N/A  Extubation was a planned trial documented in the medical record prior to removal of the original airway device: N/A    Unplanned admission to ICU related to anesthesia service up through end of PACU care- (MD51)  Unplanned admission to ICU (not initially anticipated at anesthesia start time): No

## 2019-04-22 NOTE — PROGRESS NOTES
1120:  presents from office for regular UCs that started at 1000. Denies LOF or VB; reports good FM. Orders received from Dr. Gray; pt informed. Admission procedures completed  1155: First nifedipine dose given  1235: U/s at bedside  1300: Steroid given  1340: Dr. Gray notified of increasing UC intensity; orders to check cervix. SVE /-2; MD notified and orders to get ready for c/s  1355: Report given to Analia SMITH

## 2019-04-22 NOTE — ANESTHESIA PROCEDURE NOTES
Spinal Block  Performed by: RAMAN MELO  Authorized by: RAMAN MELO     Patient Location:  OR  Start Time:  4/22/2019 3:11 PM  End Time:  4/22/2019 3:11 PM  Reason for Block: primary anesthetic    patient identified, IV checked, site marked, risks and benefits discussed, surgical consent, monitors and equipment checked, pre-op evaluation and timeout performed    Patient Position:  Sitting  Prep: Betadine and ChloraPrep    Location:  L3-4  Injection Technique:  Single-shot  Skin infiltration:  Lidocaine  Strength:  2%  Dose:  2ml  Needle Type:  Pencan  Needle Gauge:  25 G  CSF flowing pre/post injection:  Yes  Sensory Level:  T4

## 2019-04-22 NOTE — H&P
History and physical     2019    Chief complaint:   Patient presents in  labor    History of present illness: Patient is a 28-year-old female para 1 who has a twin pregnancy with an estimated due date of  based on intrauterine insemination.  Patient came in for routine monitoring today in the office.  On monitoring she was noted to be tere every 3 minutes.  Her cervix was dilated to 4 cm 90% in a breech presentation.  She was sent over to labor and delivery for steroids and topical lysis.  She is breaking through is now 6 cm.  So we are proceeding with a primary  for abnormal presentation.  One dose of steroids has been given.    Medical history: Benign    Surgical history: Gastric bypass surgery    Habits: Patient denies tobacco alcohol or drug use    Allergies: No known drug allergies    Medications: None    Obstetric history: Patient is Rh negative, rubella immune, group B strep not done    Family history: NoncontributoryTo current problem    Review of systems: Noncontributory    Physical exam  Vitals: Normal  General: Patient is awake alert pleasant  Head: Atraumatic normocephalic  Lungs: Clear to auscultation  Heart: Regular rate and rhythm without murmurs  Abdomen gravid:  Pelvic: 6 cm / 90%/breech  Extremities: 1+ edema      Assessment:  1-twin gestation at 33-6/7 weeks  2- labor      Plan:  Patient wanted a primary  baby and an abnormal presentation it is her only option discussed risks and complications of surgery including sequela of damage to bowel, bladder, large blood vessels, intestines.  She understands these complications could potentially result in permanent disability or death.  Patient has read and signed a consent form and acknowledged understanding.

## 2019-04-22 NOTE — ANESTHESIA POSTPROCEDURE EVALUATION
Patient: Marycarmen Retana    Procedure Summary     Date:  19 Room / Location:  [facility] OR 01 / LABOR AND DELIVERY    Anesthesia Start:  1448 Anesthesia Stop:  155    Procedure:   SECTION, PRIMARY Diagnosis:  (TWINS, 37 WEEKS)    Surgeon:  Rabia Cervantes M.D. Responsible Provider:  Mao Lawler D.O.    Anesthesia Type:  spinal ASA Status:  2          Final Anesthesia Type: spinal  Last vitals  BP   Blood Pressure: 104/68    Temp   36.2 °C (97.1 °F)    Pulse   Pulse: 96   Resp        SpO2          Anesthesia Post Evaluation    Patient location during evaluation: bedside  Patient participation: complete - patient participated  Level of consciousness: awake and alert  Pain score: 0    Airway patency: patent  Anesthetic complications: no  Cardiovascular status: adequate  Respiratory status: acceptable  Hydration status: acceptable    PONV: none    patient able to participate, but full recovery from regional anesthesia has not occurred and is not expected within the stipulated timeframe for the completion of the evaluation       Nurse Pain Score: 8 (NPRS)

## 2019-04-22 NOTE — ANESTHESIA PREPROCEDURE EVALUATION
Relevant Problems   Within Normal Limits   ANESTHESIA   CARDIAC   ENDO   GI      NEURO   OB   PULMONARY       Physical Exam    Airway - unable to assess       Cardiovascular - normal exam     Dental - normal exam         Pulmonary - normal exam     Abdominal - normal exam     Neurological - normal exam                 Anesthesia Plan    ASA 2       Plan - spinal   Neuraxial block will be primary anesthetic      Plan Factors:   Patient was not previously instructed to abstain from smoking on day of procedure.  Patient did not smoke on day of procedure.        Postoperative Plan: Postoperative administration of opioids is intended.        Informed Consent:    Anesthetic plan and risks discussed with patient.    Use of blood products discussed with: patient whom.

## 2019-04-22 NOTE — CARE PLAN
Problem: Knowledge Deficit  Goal: Patient/Support Person demonstrates understanding regarding condition, prognosis and treatment needs during the pregnancy  Outcome: PROGRESSING AS EXPECTED  POC discussed. Pt is remaining npo until she stops tere    Problem: Risk for injury  Goal: Patient and fetus will be free of preventable injury/complications  Outcome: PROGRESSING AS EXPECTED  Continuous monitoring being done; category 1 tracings

## 2019-04-23 LAB
ACTION RH IMMUNE GLOB 8505RHG: NORMAL
ERYTHROCYTE [DISTWIDTH] IN BLOOD BY AUTOMATED COUNT: 49.8 FL (ref 35.9–50)
GP B STREP DNA SPEC QL NAA+PROBE: NEGATIVE
HCT VFR BLD AUTO: 31.7 % (ref 37–47)
HGB BLD-MCNC: 9.4 G/DL (ref 12–16)
MCH RBC QN AUTO: 23.9 PG (ref 27–33)
MCHC RBC AUTO-ENTMCNC: 29.7 G/DL (ref 33.6–35)
MCV RBC AUTO: 80.7 FL (ref 81.4–97.8)
MORPHOLOGY BLD-IMP: NORMAL
PATHOLOGY CONSULT NOTE: NORMAL
PLATELET # BLD AUTO: 204 K/UL (ref 164–446)
PMV BLD AUTO: 11.6 FL (ref 9–12.9)
RBC # BLD AUTO: 3.93 M/UL (ref 4.2–5.4)
WBC # BLD AUTO: 14.7 K/UL (ref 4.8–10.8)

## 2019-04-23 PROCEDURE — 770002 HCHG ROOM/CARE - OB PRIVATE (112)

## 2019-04-23 PROCEDURE — 700102 HCHG RX REV CODE 250 W/ 637 OVERRIDE(OP): Performed by: ANESTHESIOLOGY

## 2019-04-23 PROCEDURE — 700111 HCHG RX REV CODE 636 W/ 250 OVERRIDE (IP): Performed by: ANESTHESIOLOGY

## 2019-04-23 PROCEDURE — A9270 NON-COVERED ITEM OR SERVICE: HCPCS | Performed by: ANESTHESIOLOGY

## 2019-04-23 PROCEDURE — A9270 NON-COVERED ITEM OR SERVICE: HCPCS | Performed by: OBSTETRICS & GYNECOLOGY

## 2019-04-23 PROCEDURE — 700102 HCHG RX REV CODE 250 W/ 637 OVERRIDE(OP): Performed by: OBSTETRICS & GYNECOLOGY

## 2019-04-23 PROCEDURE — 700112 HCHG RX REV CODE 229: Performed by: OBSTETRICS & GYNECOLOGY

## 2019-04-23 RX ORDER — IBUPROFEN 600 MG/1
600 TABLET ORAL EVERY 6 HOURS PRN
Status: DISCONTINUED | OUTPATIENT
Start: 2019-04-23 | End: 2019-04-26 | Stop reason: HOSPADM

## 2019-04-23 RX ORDER — ONDANSETRON 4 MG/1
4 TABLET, ORALLY DISINTEGRATING ORAL EVERY 6 HOURS PRN
Status: DISCONTINUED | OUTPATIENT
Start: 2019-04-23 | End: 2019-04-26 | Stop reason: HOSPADM

## 2019-04-23 RX ORDER — OXYCODONE HYDROCHLORIDE AND ACETAMINOPHEN 5; 325 MG/1; MG/1
2 TABLET ORAL EVERY 4 HOURS PRN
Status: DISCONTINUED | OUTPATIENT
Start: 2019-04-23 | End: 2019-04-26 | Stop reason: HOSPADM

## 2019-04-23 RX ORDER — OXYCODONE HYDROCHLORIDE AND ACETAMINOPHEN 5; 325 MG/1; MG/1
1 TABLET ORAL EVERY 4 HOURS PRN
Status: DISCONTINUED | OUTPATIENT
Start: 2019-04-23 | End: 2019-04-26 | Stop reason: HOSPADM

## 2019-04-23 RX ORDER — MORPHINE SULFATE 4 MG/ML
4 INJECTION, SOLUTION INTRAMUSCULAR; INTRAVENOUS
Status: DISCONTINUED | OUTPATIENT
Start: 2019-04-23 | End: 2019-04-26 | Stop reason: HOSPADM

## 2019-04-23 RX ORDER — KETOROLAC TROMETHAMINE 30 MG/ML
30 INJECTION, SOLUTION INTRAMUSCULAR; INTRAVENOUS EVERY 6 HOURS
Status: DISCONTINUED | OUTPATIENT
Start: 2019-04-23 | End: 2019-04-23

## 2019-04-23 RX ORDER — ONDANSETRON 2 MG/ML
4 INJECTION INTRAMUSCULAR; INTRAVENOUS EVERY 6 HOURS PRN
Status: DISCONTINUED | OUTPATIENT
Start: 2019-04-23 | End: 2019-04-26 | Stop reason: HOSPADM

## 2019-04-23 RX ADMIN — KETOROLAC TROMETHAMINE 30 MG: 30 INJECTION, SOLUTION INTRAMUSCULAR at 06:05

## 2019-04-23 RX ADMIN — DOCUSATE SODIUM 100 MG: 100 CAPSULE, LIQUID FILLED ORAL at 11:21

## 2019-04-23 RX ADMIN — ACETAMINOPHEN 1000 MG: 500 TABLET ORAL at 12:11

## 2019-04-23 RX ADMIN — KETOROLAC TROMETHAMINE 30 MG: 30 INJECTION, SOLUTION INTRAMUSCULAR at 00:26

## 2019-04-23 RX ADMIN — KETOROLAC TROMETHAMINE 30 MG: 30 INJECTION, SOLUTION INTRAMUSCULAR at 12:11

## 2019-04-23 RX ADMIN — ACETAMINOPHEN 1000 MG: 500 TABLET ORAL at 06:05

## 2019-04-23 RX ADMIN — ACETAMINOPHEN 1000 MG: 500 TABLET ORAL at 00:26

## 2019-04-23 RX ADMIN — IBUPROFEN 600 MG: 600 TABLET ORAL at 18:40

## 2019-04-23 NOTE — CARE PLAN
Problem: Communication  Goal: The ability to communicate needs accurately and effectively will improve  Outcome: PROGRESSING AS EXPECTED  Reviewed plan of care with patient who verbalized understanding.    Problem: Altered physiologic condition related to postoperative  delivery  Goal: Patient physiologically stable as evidenced by normal lochia, palpable uterine involution and vital signs within normal limits  Outcome: PROGRESSING AS EXPECTED  Fundus firm.  Lochia light.    Problem: Potential for postpartum infection related to surgical incision, compromised uterine condition, urinary tract or respiratory compromise  Goal: Patient will be afebrile and free from signs and symptoms of infection  Outcome: PROGRESSING AS EXPECTED  Patient is afebrile.  Incision approximated.  No redness noted.

## 2019-04-23 NOTE — PROGRESS NOTES
Bedside report done, patient in bed with FOB at bedside. Assessment done, tegaderm dressing with air inside the incision will continue to observe.

## 2019-04-23 NOTE — PROGRESS NOTES
Progress Note    Postop day 1  for  labor and delivery of twins at 33-6/7 weeks    Subjective: Patient is doing well lochia is normal.  Has normal GI and  function    Objective: Vital signs are normal  General: Patient's awake alert pleasant  Head: Atraumatic normocephalic  Abdomen: Fundus is firm, appropriate tenderness  Incision: Clean and dry    Assessment:  Postpartum day 1  section    Plan:  Routine care      Rabia Cervantes MD

## 2019-04-23 NOTE — PROGRESS NOTES
0710- Bedside report received from LUIS Rasmussen.  Patient denied needs.  0735- Patient ambulated back to room from visiting infants in NICU.  0915- Dr. Cervantes here to see patient.  Verbal order received to remove old tegaderm and replace with new tegaderm.  0945- Patient assessment done.  Incision approximated, scant drainage noted under the tegaderm dressing.  Tegaderm removed.  Incision redressed with tegaderm per MD order.  Patient stated that she is voiding without difficulty.  Patient denied dizziness and stated that she is walking without difficulty.  Discussed pain management plan and patient prefers to call for further pain intervention as needed.  Reviewed plan of care.  Patient instructed to ambulate in hallways.  Patient instructed to use incentive spirometer hourly.  Patient verbalized understanding.

## 2019-04-23 NOTE — OR SURGEON
Immediate Post OP Note    PreOp Diagnosis:  1- twin pregnancy at 33-6/7 weeks,   2- labor with advanced dilation  3.-Breech presentation twin A    PostOp Diagnosis:   1.-Same as above  2- viable female, Apgars 6, 8, delivery viable male, Apgars pending    Procedure(s):   SECTION, PRIMARY - Wound Class: Clean Contaminated    Surgeon(s):  JONELLE Hernandez M.D.    Anesthesiologist/Type of Anesthesia:  Anesthesiologist: Mao Lawler D.O./Spinal    Surgical Staff:  Circulator: Analia Valdes R.N.  Scrub Person: Shirin Marcum  L&GERBER Baby  Nurse: Kasia Bullock R.N.    Specimens removed if any:  * No specimens in log *    Estimated Blood Loss: 800 cc    Findings: Normal pelvis, tubes and ovaries    Complications: None    Indications: Patient is a 28-year-old female para 1 and had a known twin gestation at 33-6/7 weeks by intrauterine insemination.  Patient presented for routine monitoring was noted to be tere every 3 minutes.  Cervix was 4 cm, 90% effaced her breech presentation.  Patient was sent to labor and delivery for evaluation for steroids possible took lysis.  She is given a steroid shot Procardia.  Patient broke through was 6 cm so we proceeded with  section    Procedure: Patient was taken the OR and given spinal anesthesia. Patient was prepped and draped in sterile manner. Efficacy of anesthesia was tested and when adequate a Pfannenstiel incision was made and carried through the fascia. Fascia was dissected bilaterally and dissected off the rectus muscle inferiorly and superiorly. Rectus muscles  midline. Peritoneum was entered sharply extended sharply inferior superiorly. A bladder blade was placed in a low transverse uterine incision was made in the lower uterine segment. Fluid was clear. Baby was in a  breech presentation. The feet were grasped and gentle traction was applied . The baby was delivered up to the shoulders. The  shoulders. Arms were swept across the chest. Head was kept in a flexed presentation and baby was delivered without complication. Cord was clamped and cut and baby was handed off to nurse. Apgars were 6/8.Twin B was rotated to a vertex presentation and delivered without complication.  Baby was vigorous, moving all extremities.    The placenta was expressed spontaneously. The uterus was then wiped clean inspected for retained products of conception. When it was assured there was none we placed the Dorothy retractor. Inspected for bowel impingement when there was none we tightened it reinspected. The lower uterine incision was closed in running 1 Vicryl suture followed by a second imbricating layer  .     When hemostasis was assured we irrigated the pelvis and  wiped it clean.  The peritoneum was A running 3-0 Vicryl suture.   The subfascial area was irrigated and hemostatic the fascia was closed in a running 1 Vicryl suture.   Angus's layer was closed with 3- 0 Vicryl suture. Skin was closed with 4-0 subcuticular suture. Incision was dressed sterilely. Patient discharged to recovery in good condition.        4/22/2019 7:34 PM Rabia Cervantes M.D.

## 2019-04-23 NOTE — CARE PLAN
Problem: Altered physiologic condition related to postoperative  delivery  Goal: Patient physiologically stable as evidenced by normal lochia, palpable uterine involution and vital signs within normal limits  Outcome: PROGRESSING AS EXPECTED  Fundus firm at U, lochia rubra minimal. Surgical incision with tegaderm dressing intact. V/S stable at this time.     Problem: Alteration in comfort related to surgical incision and/or after birth pains  Goal: Patient verbalizes acceptable pain level  Outcome: PROGRESSING AS EXPECTED  Patient will be medicated as scheduled. Pain controlled at this time.

## 2019-04-23 NOTE — CONSULTS
HG pump is in room, and JAGDISH had pumped once already. Was able to get drops of colostrum, not enough to collect. Reviewed pumping settings with JAGDISH, denies pain with using pump.    JAGDISH states she will attempt to breastfeed twins, but states she did not have a good experience with her first child, even though she had seen 3 other lactation consultants, and even pumped, but she felt like she could not produce enough for him, and stopped breastfeeding after 2 weeks.    Offered to give her pump rental info, but JAGDISH states it will be highly unlikely she will continue to pump after her own discharge.      JAGDISH has no other questions or concerns regarding breastfeeding. Encouraged to call for lactation assistance if needed when begins to latch infants in NICU.

## 2019-04-23 NOTE — PROGRESS NOTES
Up to BR with assistance, ambulating well with steady gait. Perineal care rendered and went to down to NICU via wheelchair. Will continue to monitor.

## 2019-04-23 NOTE — CARE PLAN
Problem: Communication  Goal: The ability to communicate needs accurately and effectively will improve  Outcome: PROGRESSING AS EXPECTED  Reviewed plan of care with patient who verbalized understanding.    Problem: Altered physiologic condition related to postoperative  delivery  Goal: Patient physiologically stable as evidenced by normal lochia, palpable uterine involution and vital signs within normal limits  Outcome: PROGRESSING AS EXPECTED  Fundus firm.  Lochia scant.  VSS.    Problem: Potential for postpartum infection related to surgical incision, compromised uterine condition, urinary tract or respiratory compromise  Goal: Patient will be afebrile and free from signs and symptoms of infection  Outcome: PROGRESSING AS EXPECTED  Patient is afebrile.  Incision approximated.  No redness or new drainage noted.  Tegaderm dressing changed per MD order.

## 2019-04-23 NOTE — PROGRESS NOTES
1715- Patient arrived to Room S335.  Report received from VERONIKA Helms, RN.  IV saline locked by LUIS Helms.  1730- Patient assessment done.  Indwelling catheter to gravity.  Sequential stockings on.  O2 saturation monitor on.  Discussed pain management with patient.  Patient informed of MD orders for tylenol and toradol every 6 hours for pain.  Patient prefers to call for further pain intervention as needed.  Patient oriented to room, call system, and infant security.  Reviewed plan of care.  Patient verbalized understanding.  FOB at bedside.  1805- Dr. Gray notified of patient's heart rate of 106, blood pressure of 97/52, that patient has received 2 bags of LR with 20 units pitocin, lochia is light, and patient c/o dizziness.  Per Dr. Gray, discontinue order for continuous LR at 125 ml/hr.  Telephone order received to discontinue orders for penicillin G potassium and celestone.

## 2019-04-24 PROCEDURE — A9270 NON-COVERED ITEM OR SERVICE: HCPCS | Performed by: OBSTETRICS & GYNECOLOGY

## 2019-04-24 PROCEDURE — 700102 HCHG RX REV CODE 250 W/ 637 OVERRIDE(OP): Performed by: OBSTETRICS & GYNECOLOGY

## 2019-04-24 PROCEDURE — 700112 HCHG RX REV CODE 229: Performed by: OBSTETRICS & GYNECOLOGY

## 2019-04-24 PROCEDURE — 770002 HCHG ROOM/CARE - OB PRIVATE (112)

## 2019-04-24 RX ORDER — FERROUS GLUCONATE 324(38)MG
324 TABLET ORAL 2 TIMES DAILY
Status: DISCONTINUED | OUTPATIENT
Start: 2019-04-24 | End: 2019-04-26 | Stop reason: HOSPADM

## 2019-04-24 RX ADMIN — OXYCODONE HYDROCHLORIDE AND ACETAMINOPHEN 1 TABLET: 5; 325 TABLET ORAL at 21:58

## 2019-04-24 RX ADMIN — IBUPROFEN 600 MG: 600 TABLET ORAL at 15:32

## 2019-04-24 RX ADMIN — OXYCODONE HYDROCHLORIDE AND ACETAMINOPHEN 1 TABLET: 5; 325 TABLET ORAL at 13:03

## 2019-04-24 RX ADMIN — OXYCODONE HYDROCHLORIDE AND ACETAMINOPHEN 1 TABLET: 5; 325 TABLET ORAL at 17:01

## 2019-04-24 RX ADMIN — DOCUSATE SODIUM 100 MG: 100 CAPSULE, LIQUID FILLED ORAL at 17:00

## 2019-04-24 RX ADMIN — IBUPROFEN 600 MG: 600 TABLET ORAL at 21:58

## 2019-04-24 RX ADMIN — DOCUSATE SODIUM 100 MG: 100 CAPSULE, LIQUID FILLED ORAL at 02:04

## 2019-04-24 RX ADMIN — IBUPROFEN 600 MG: 600 TABLET ORAL at 09:02

## 2019-04-24 RX ADMIN — OXYCODONE HYDROCHLORIDE AND ACETAMINOPHEN 1 TABLET: 5; 325 TABLET ORAL at 09:02

## 2019-04-24 RX ADMIN — FERROUS GLUCONATE 324 MG: 324 TABLET ORAL at 09:09

## 2019-04-24 RX ADMIN — FERROUS GLUCONATE 324 MG: 324 TABLET ORAL at 17:00

## 2019-04-24 RX ADMIN — IBUPROFEN 600 MG: 600 TABLET ORAL at 02:04

## 2019-04-24 ASSESSMENT — EDINBURGH POSTNATAL DEPRESSION SCALE (EPDS)
I HAVE BEEN SO UNHAPPY THAT I HAVE BEEN CRYING: NO, NEVER
I HAVE BEEN SO UNHAPPY THAT I HAVE HAD DIFFICULTY SLEEPING: NOT AT ALL
I HAVE BEEN ANXIOUS OR WORRIED FOR NO GOOD REASON: NO, NOT AT ALL
THE THOUGHT OF HARMING MYSELF HAS OCCURRED TO ME: NEVER
I HAVE FELT SAD OR MISERABLE: NO, NOT AT ALL
I HAVE BLAMED MYSELF UNNECESSARILY WHEN THINGS WENT WRONG: NO, NEVER
THINGS HAVE BEEN GETTING ON TOP OF ME: NO, I HAVE BEEN COPING AS WELL AS EVER
I HAVE LOOKED FORWARD WITH ENJOYMENT TO THINGS: AS MUCH AS I EVER DID
I HAVE FELT SCARED OR PANICKY FOR NO GOOD REASON: NO, NOT AT ALL
I HAVE BEEN ABLE TO LAUGH AND SEE THE FUNNY SIDE OF THINGS: AS MUCH AS I ALWAYS COULD

## 2019-04-24 NOTE — PROGRESS NOTES
Incision is draining a small amount of thin fluid from a 1 cm opening in the incision.  Dr Buck called.  Telfa dressing to be applied.  Report given to Paola SMITH.   Telfa/guazjose alfredo/medipore dressing applied per Dr Buck.

## 2019-04-24 NOTE — PROGRESS NOTES
Pt assessed. Discussed POC, answered questions, verbalized understanding. Discussed pump settings at 80 CPM for 2 minutes, then 60 CPM for an additional 13 minutes, every 3 hours and no longer than 5 hours. Bed locked & low, call light within reach. No further needs at this time.

## 2019-04-24 NOTE — LACTATION NOTE
Met with MOB for a lactation follow up visit.  MOB stated she is discouraged with pumping because she is only receiving a few drops of colostrum when pumping.  Discussed the reasoning behind pumping and informed MOB that it may be a couple of days before she receives anything more than a few drops of colostrum.  Reviewed pumping schedule with MOB and MOB stated she has not been pumping as instructed.  Discussed the effect of supply and demand on milk production.  MOB stated she would like to see a Lactation Consultant on Friday before discharge from the hospital to review the status of her milk production.  Pump settings reviewed are: 80 CPM down to 60 after 1-2 minutes/suction set to comfort at 40%- informed higher suction rate does not necessarily equate with an increase in milk volume/pumps for 15 minutes.  MOB denied pain and tissue damage to nipples and areolas with pump use.    MOB verbalized understanding of all information provided to her and denied having any further questions at this time.  Encouraged MOB to call for lactation assistance as needed.

## 2019-04-24 NOTE — CARE PLAN
Problem: Infection  Goal: Will remain free from infection    Intervention: Implement standard precautions and perform hand washing before and after patient contact  Hands washed appropriately      Problem: Pain Management  Goal: Pain level will decrease to patient's comfort goal    Intervention: Follow pain managment plan developed in collaboration with patient and Interdisciplinary Team  Discussed options for pain management.

## 2019-04-24 NOTE — LACTATION NOTE
Met with mother for follow-up visit. RN taught her hand expression and mother was able to remove 10ml from each breast to take to NICU. Mother reports she feels the pump is not working well for her, has not pumped since yesterday. Offered to fit flanges and assist with pumping, mother agrees. 25mm flanges fit well, removed milk easily with 40% suction to comfort, educated on importance of routine pumping in addition to hand expression and how to properly center nipples in pump flanges. Mother excited about milk production today, does have significant history of PCOS and low milk supply with first child. Mother reports that if the twins were born at term she did not plan to breastfeed. Provided support and encouragement. Lactation to follow as needed.

## 2019-04-24 NOTE — PROGRESS NOTES
Doing well. Decreased vaginal bleeding, pain controlled. Voiding. Passing gas. Her incision has been draining red fluid since yesterday morning. Babies in NICU, attempting to pump.  VSS AF  PE:  Abd apprpo mild TTP, no peritoneal signs, tagaderm dressing with serosanguinous fluid underneath, dressing removed, wound= C/D/I, no drainage upon palpation of incision, no s/s erythema          Ext: No s/s DVT          Lung CTAB          CV RRR  Lab= Hb > 10  RH pos, RI    A: S/p c/s pod number 2, doing well  Incisional drainage - replace dressing, continue to monitor closely         P:  Continue post partum care, ambulation encouarged

## 2019-04-24 NOTE — PROGRESS NOTES
Report received.  Patient comfortable, discussed pain management.  See Mar.  Patient up to bathroom independently.  Patient assisted with manual expression.

## 2019-04-24 NOTE — PROGRESS NOTES
Report received from Corrine SMITH @ [id number]. Assumed care. Medicated for pain. Rest provided. Encouraged to call if with need.

## 2019-04-25 PROCEDURE — 700102 HCHG RX REV CODE 250 W/ 637 OVERRIDE(OP): Performed by: OBSTETRICS & GYNECOLOGY

## 2019-04-25 PROCEDURE — A9270 NON-COVERED ITEM OR SERVICE: HCPCS | Performed by: OBSTETRICS & GYNECOLOGY

## 2019-04-25 PROCEDURE — 770002 HCHG ROOM/CARE - OB PRIVATE (112)

## 2019-04-25 PROCEDURE — 700112 HCHG RX REV CODE 229: Performed by: OBSTETRICS & GYNECOLOGY

## 2019-04-25 RX ADMIN — DOCUSATE SODIUM 100 MG: 100 CAPSULE, LIQUID FILLED ORAL at 04:22

## 2019-04-25 RX ADMIN — FERROUS GLUCONATE 324 MG: 324 TABLET ORAL at 18:15

## 2019-04-25 RX ADMIN — DOCUSATE SODIUM 100 MG: 100 CAPSULE, LIQUID FILLED ORAL at 18:17

## 2019-04-25 RX ADMIN — IBUPROFEN 600 MG: 600 TABLET ORAL at 11:14

## 2019-04-25 RX ADMIN — OXYCODONE HYDROCHLORIDE AND ACETAMINOPHEN 1 TABLET: 5; 325 TABLET ORAL at 06:27

## 2019-04-25 RX ADMIN — IBUPROFEN 600 MG: 600 TABLET ORAL at 18:15

## 2019-04-25 RX ADMIN — IBUPROFEN 600 MG: 600 TABLET ORAL at 04:22

## 2019-04-25 RX ADMIN — OXYCODONE HYDROCHLORIDE AND ACETAMINOPHEN 1 TABLET: 5; 325 TABLET ORAL at 02:32

## 2019-04-25 RX ADMIN — FERROUS GLUCONATE 324 MG: 324 TABLET ORAL at 06:27

## 2019-04-25 NOTE — CARE PLAN
Problem: Altered physiologic condition related to postoperative  delivery  Goal: Patient physiologically stable as evidenced by normal lochia, palpable uterine involution and vital signs within normal limits  Outcome: PROGRESSING AS EXPECTED  VSS. Fundus firm with light lochia. Patient denies any heavy bleeding.     Problem: Potential for postpartum infection related to surgical incision, compromised uterine condition, urinary tract or respiratory compromise  Goal: Patient will be afebrile and free from signs and symptoms of infection  Outcome: PROGRESSING AS EXPECTED  Patient remains afebrile. No s/sx of infection. Serosanguinous drainage outlined in pen. Will continue to  Monitor.

## 2019-04-25 NOTE — PROGRESS NOTES
Received bedside report from LUIS Guevara. Patient in bed, declines pain at this time. But states she would like to receive medications when available. POC discussed. Whiteboards updated, POC discussed. Call light within reach. Patient encouraged to call with any needs and or concerns.

## 2019-04-25 NOTE — LACTATION NOTE
Follow-up visit with mother this morning, reports she did not pump overnight, pumped 30ml this morning and is taking milk to NICU. Educated on importance of routine pumping/expressing at least 8 times in 24 hours and supply/demand mechanism of milk supply. Reviewed warm and cool therapy to encourage milk flow and prevent engorgement as breasts are filling. Reviewed HG pump rental options. Mother denies questions/concerns.

## 2019-04-25 NOTE — DISCHARGE PLANNING
Discharge Planning Assessment Post Partum    Reason for Referral: NICU, Twins  Address: [address] [place] [id number]  Type of Living Situation: House with FOB and other child  Mom Diagnosis: Pregnancy  Baby Diagnosis: Prematurity, Respiratory Distress  Primary Language: English    Name of Baby: Edwin Retana  Mother of the Baby: Marycarmen Retana ([phone])  Father of the Baby: Scot Retana  Involved in baby’s care? Yes  Contact Information: [phone]    Prenatal Care: Yes  Mom's PCP: None  PCP for new baby: Dr. Jean    Support System: Yes  Coping/Bonding between mother & baby: Yes  Source of Feeding: Breast  Supplies for Infant: Prepared    Mom's Insurance: [employer]  Baby Covered on Insurance: Yes, MOB is working on adding baby to her insurance.   Mother Employed/School: Yes, MOB is a ; FOB is a traveling medical assistant   Other children in the home/names & ages: Yes, Giani- 2    Financial Hardship/Income: No  Mom's Mental status: Alert and Oriented x 4  Services used prior to admit: None    CPS History: No  Psychiatric History: No  Domestic Violence History: No  Drug/ETOH History: No    Resources Provided: Children and Family Resource List  Referrals Made: None; LSW informed MOB of the [facility]. MOB stated she is staying with her mom and her 2 year old and does not need to stay at the house at this time. LSW encouraged MOB to reach out if she is interested in staying at the [facility] in the future.      Clearance for Discharge: Babies are clear to discharge home with MOB/FOB upon medical clearance.     Ongoing Plan: Continue to provide support and resources to family until dc.

## 2019-04-25 NOTE — PROGRESS NOTES
POD#3    Pt is doing very well, ambulatory , eating , passing gas  Pain well controlled  Will stay till am since babies in ICN    VS afebrile  abd there is small bloody drainage on dressing  I removed today and see a small 1-2cm defect on incision  I tried to probe with q tip but this is at skin  Does not appear to have any active drainage    Will keep small dressing over area till drainage stops  Consider discharge home in am

## 2019-04-26 VITALS
WEIGHT: 218 LBS | HEIGHT: 68 IN | SYSTOLIC BLOOD PRESSURE: 108 MMHG | DIASTOLIC BLOOD PRESSURE: 67 MMHG | HEART RATE: 68 BPM | BODY MASS INDEX: 33.04 KG/M2 | OXYGEN SATURATION: 98 % | RESPIRATION RATE: 15 BRPM | TEMPERATURE: 97.9 F

## 2019-04-26 PROCEDURE — 700102 HCHG RX REV CODE 250 W/ 637 OVERRIDE(OP): Performed by: OBSTETRICS & GYNECOLOGY

## 2019-04-26 PROCEDURE — A9270 NON-COVERED ITEM OR SERVICE: HCPCS | Performed by: OBSTETRICS & GYNECOLOGY

## 2019-04-26 PROCEDURE — 700112 HCHG RX REV CODE 229: Performed by: OBSTETRICS & GYNECOLOGY

## 2019-04-26 RX ORDER — IBUPROFEN 600 MG/1
600 TABLET ORAL EVERY 6 HOURS PRN
Qty: 30 TAB | Refills: 1 | Status: SHIPPED | OUTPATIENT
Start: 2019-04-26

## 2019-04-26 RX ADMIN — IBUPROFEN 600 MG: 600 TABLET ORAL at 07:29

## 2019-04-26 RX ADMIN — FERROUS GLUCONATE 324 MG: 324 TABLET ORAL at 08:41

## 2019-04-26 RX ADMIN — IBUPROFEN 600 MG: 600 TABLET ORAL at 00:18

## 2019-04-26 RX ADMIN — DOCUSATE SODIUM 100 MG: 100 CAPSULE, LIQUID FILLED ORAL at 07:29

## 2019-04-26 NOTE — CARE PLAN
Problem: Altered physiologic condition related to postoperative  delivery  Goal: Patient physiologically stable as evidenced by normal lochia, palpable uterine involution and vital signs within normal limits  Outcome: PROGRESSING AS EXPECTED  Fundus firm. Lochia light to scant. Vital signs WDL.     Problem: Alteration in comfort related to surgical incision and/or after birth pains  Goal: Patient is able to ambulate, care for self and infant with acceptable pain level  Outcome: PROGRESSING AS EXPECTED  Pt able to ambulate, care for self. Pt states her pain is controlled with prn pain medication. Pt's pain reassessed throughout this shift.

## 2019-04-26 NOTE — PROGRESS NOTES
Assessment completed. WDL. Vital signs stable. Patient progressing according to plan of care. Patient up and ambulating. Pt states she is voiding without difficulty. Patient claims to have good pain relief with prn pain medications. Pt states she would like her prn motrin when it's due. Pt denies any other issues at this time. Pt pumping. Pt encouraged to call for any needs.

## 2019-04-26 NOTE — PROGRESS NOTES
Hospital Day : 4    S: doing well; babies in icn    O:  Vitals:    04/24/19 1800 04/25/19 0600 04/25/19 1800 04/26/19 0600   BP: 112/72 103/71 112/72 108/67   Pulse: 79 81 96 68   Resp: 20 18 18 15   Temp: 36.7 °C (98.1 °F) 37.1 °C (98.7 °F) 36.7 °C (98.1 °F) 36.6 °C (97.9 °F)   TempSrc: Temporal Temporal Temporal Temporal   SpO2: 98% 95% 96% 98%   Weight:       Height:                       abd soft; midline wound defect without ev of infection or dehiss    A: pod4 sp 1ltcs twin/pool; doing well    P: dc

## 2019-04-26 NOTE — LACTATION NOTE
Met with this NICU Mom before her discharge. She says that she's been pumping Q 3hrs now and getting about 10 mls on Day 4. She has a history of PCOS and though she didn't have a good milk supply with her first child,  this time she is encouraged with the milk she is pumping. Discussed milk supply and how it is supported and generated and how important it is to pump at least Q 3hrs. Her pump settings are 80 to 60, 45% for 15 min. She states that she's ok with the suction at 45% but let her know that she can go lower as long as she's comfortable.Suggested she try 35% next time to see how that feels.    She will need a pump for home and wants to rent an mYwindow [place] from [employer]. Gave her paperwork to fill out for that along with info on pump pricing.     Pumping Plan:    -Try to pump at least 8 to 10 times in a 24 hr period.  - If needed may take a 5 hr sleep period at night as long as you can get the 8 times in during the rest of the day.   - Try to pump at your babies' bedside or immediately after holding them to take advantage of your oxytocin release from being near them.  - Get bottles/labels from the NICU.  - Store and transport your milk safely to the NICU.

## 2019-04-26 NOTE — CARE PLAN
Problem: Potential for postpartum infection related to surgical incision  Goal: Patient will be absent from signs and symptoms of infection   Outcome: PROGRESSING AS EXPECTED   VSS     Problem: Potential knowledge deficit related to lack of understanding of self and  care   Goal: Patient will demonstrate ability to care for self and infant   Outcome: PROGRESSING AS EXPECTED   MOB attentive to infant needs in NICU

## 2019-04-26 NOTE — DISCHARGE INSTRUCTIONS
POSTPARTUM DISCHARGE INSTRUCTIONS FOR MOM    YOB: 1990   Age: 28 y.o.               Admit Date: 2019     Discharge Date: 2019  Attending Doctor:  Rabia Cervantes M.D.                  Allergies:  Patient has no known allergies.    Discharged to home by car. Discharged via wheelchair, hospital escort: Yes.  Special equipment needed: Not Applicable  Belongings with: Personal  Be sure to schedule a follow-up appointment with your primary care doctor or any specialists as instructed.     Discharge Plan:   Diet Plan: Discussed  Activity Level: Discussed  Confirmed Follow up Appointment: Patient to Call and Schedule Appointment  Confirmed Symptoms Management: Discussed  Medication Reconciliation Updated: Yes  Influenza Vaccine Indication: Patient Refuses    REASONS TO CALL YOUR OBSTETRICIAN:  1.   Persistent fever or shaking chills (Temperature higher than 100.4)  2.   Heavy bleeding (soaking more than 1 pad per hour); Passing clots  3.   Foul odor from vagina  4.   Mastitis (Breast infection; breast pain, chills, fever, redness)  5.   Urinary pain, burning or frequency  6.   Episiotomy infection  7.   Abdominal incision infection  8.   Severe depression longer than 24 hours    HAND WASHING  · Prior to handling the baby.  · Before breastfeeding or bottle feeding baby.  · After using the bathroom or changing the baby's diaper.    WOUND CARE  Ask your physician for additional care instructions.  In general:    ·  Incision:      · Keep clean and dry.    · Do NOT lift anything heavier than your baby for up to 6 weeks.    · There should not be any opening or pus.      VAGINAL CARE  · Nothing inside vagina for 6 weeks: no sexual intercourse, tampons or douching.  · Bleeding may continue for 2-4 weeks.  Amount may vary.    · Call your physician for heavy bleeding which means soaking more than 1 pad per hour    BIRTH CONTROL  · It is possible to become pregnant at any time after delivery and while  "breastfeeding.  · Plan to discuss a method of birth control with your physician at your follow up visit. visit.    DIET AND ELIMINATION  · Eating more fiber (bran cereal, fruits, and vegetables) and drinking plenty of fluids will help to avoid constipation.  · Urinary frequency after childbirth is normal.    POSTPARTUM BLUES  During the first few days after birth, you may experience a sense of the \"blues\" which may include impatience, irritability or even crying.  These feeling come and go quickly.  However, as many as 1 in 10 women experience emotional symptoms known as postpartum depression.    Postpartum depression:  May start as early as the second or third day after delivery or take several weeks or months to develop.  Symptoms of \"blues\" are present, but are more intense:  Crying spells; loss of appetite; feelings of hopelessness or loss of control; fear of touching the baby; over concern or no concern at all about the baby; little or no concern about your own appearance/caring for yourself; and/or inability to sleep or excessive sleeping.  Contact your physician if you are experiencing any of these symptoms.    Crisis Hotline:  · [place] Crisis Hotline:  0-522-QFOAUKF  Or 1-151.919.5590  · [place] Crisis Hotline:  1-702.959.9560  Or 354-690-9218    PREVENTING SHAKEN BABY:  If you are angry or stressed, PUT THE BABY IN THE CRIB, step away, take some deep breaths, and wait until you are calm to care for the baby.  DO NOT SHAKE THE BABY.  You are not alone, call a supporter for help.    · Crisis Call Center 24/7 crisis line 663-357-3209 or 1-758.356.3371  · You can also text them, text \"ANSWER\" to 749110    QUIT SMOKING/TOBACCO USE:  I understand the use of any tobacco products increases my chance of suffering from future heart disease and could cause other illnesses which may shorten my life. Quitting the use of tobacco products is the single most important thing I can do to improve my health. For further " information on smoking / tobacco cessation call a Toll Free Quit Line at 1-218.421.3898 (*[employer] [place]) or 1-253.735.6046 (American Lung Association) or you can access the web based program at www.lungusa.org.    · [place] Tobacco Users Help Line:  (875) 988-1746       Toll Free: 1-628.334.3892  · Quit Tobacco Program [facility] (021)705-8129    DEPRESSION / SUICIDE RISK:  As you are discharged from this [facility], it is important to learn how to keep safe from harming yourself.    Recognize the warning signs:  · Abrupt changes in personality, positive or negative- including increase in energy   · Giving away possessions  · Change in eating patterns- significant weight changes-  positive or negative  · Change in sleeping patterns- unable to sleep or sleeping all the time   · Unwillingness or inability to communicate  · Depression  · Unusual sadness, discouragement and loneliness  · Talk of wanting to die  · Neglect of personal appearance   · Rebelliousness- reckless behavior  · Withdrawal from people/activities they love  · Confusion- inability to concentrate     If you or a loved one observes any of these behaviors or has concerns about self-harm, here's what you can do:  · Talk about it- your feelings and reasons for harming yourself  · Remove any means that you might use to hurt yourself (examples: pills, rope, extension cords, firearm)  · Get professional help from the community (Mental Health, Substance Abuse, psychological counseling)  · Do not be alone:Call your Safe Contact- someone whom you trust who will be there for you.  · Call your local CRISIS HOTLINE 065-6036 or 367-556-7204  · Call your local Children's Mobile Crisis Response Team [place] (691) 734-9771 or www.Zoomingo  · Call the toll free National Suicide Prevention Hotlines   · National Suicide Prevention Lifeline 567-022-MGBM (1569)  · National Hope Line Network 800-SUICIDE  (352-6914)    DISCHARGE SURVEY:  Thank you for choosing [facility].  We hope we provided you with very good care.  You may be receiving a survey in the mail.  Please fill it out.  Your opinion is valuable to us.    ADDITIONAL EDUCATIONAL MATERIALS GIVEN TO PATIENT:Pumping Plan:    -Try to pump at least 8 to 10 times in a 24 hr period.  - If needed may take a 5 hr sleep period at night as long as you can get the 8 times in during the rest of the day.   - Try to pump at your babies' bedside or immediately after holding them to take advantage of your oxytocin release from being near them.  - Get bottles/labels from the NICU.  - Store and transport your milk safely to the NICU.              My signature on this form indicates that:  1.  I have reviewed and understand the above information  2.  My questions regarding this information have been answered to my satisfaction.  3.  I have formulated a plan with my discharge nurse to obtain my prescribed medication for home.

## 2019-04-26 NOTE — DISCHARGE SUMMARY
DATE OF ADMISSION:  2019    DATE OF DISCHARGE:  2019    ADMITTING DIAGNOSES:  1.  Twin pregnancy at 33-6/7 weeks.  2.   labor with advanced maternal age.  3.  Baby A in breech presentation.    DISCHARGE DIAGNOSES:  1.  Twin pregnancy at 33-6/7 weeks.  2.   labor with advanced maternal age.  3.  Baby A in breech presentation.  4.  Status post betamethasone and failed tocolysis, status post primary low   transverse .    HOSPITAL COURSE IN DETAIL:  This patient was admitted on the aforementioned   day with the aforementioned diagnoses, given dose of steroids, her labor   progressed and primary low transverse  was performed on the    without complication.  The patient was taken to recovery room in stable   condition, babies went to the NICU.  On postop day #1, she is doing well   without complaint, tolerating clear liquid diet.  By postop day #2, she is   ambulating well.  Postop day #3, she had a midline wound separation, which was   probed and not found to be through the fascia.  Today postop day #4, she is   doing well without complaint, tolerating regular diet with minimal lochia,   babies are doing well in the NICU, she desires discharge home.  She is   pumping.  She is afebrile.  Her vital signs are within normal limits.  Her   abdomen is soft with full fundus below the umbilicus.  Wound is clean, dry.    There is midline wound defect that appears healthy without evidence of   infection, erythema, induration.    ASSESSMENT:  At this time is postoperative day #4, status post primary low   transverse  for  labor twins, doing well, desires discharge   home.    PLAN:  At this time:  1.  Discharge home.  2.  Follow up in 2 weeks.  3.  Pelvic rest.  4.  Lift precautions.  5.  Scripts for Motrin written.  6.  Wound care discussed, infection precautions given.       ____________________________________     MD PAPA Magana / PATITO    DD:  2019  06:29:36  DT:  04/26/2019 06:50:08    D#:  [id number]  Job#:  [id number]

## 2019-04-26 NOTE — PROGRESS NOTES
Patient D/C in stable condition per MD order. Discharge education provided, patient receptive. All questions answered.

## 2019-05-14 ENCOUNTER — ANESTHESIA (OUTPATIENT)
Dept: OBGYN | Facility: MEDICAL CENTER | Age: 29
End: 2019-05-14
Payer: COMMERCIAL

## 2020-03-18 ENCOUNTER — HOSPITAL ENCOUNTER (EMERGENCY)
Facility: MEDICAL CENTER | Age: 30
End: 2020-03-18
Attending: EMERGENCY MEDICINE
Payer: COMMERCIAL

## 2020-03-18 ENCOUNTER — APPOINTMENT (OUTPATIENT)
Dept: RADIOLOGY | Facility: MEDICAL CENTER | Age: 30
End: 2020-03-18
Attending: EMERGENCY MEDICINE
Payer: COMMERCIAL

## 2020-03-18 VITALS
RESPIRATION RATE: 16 BRPM | BODY MASS INDEX: 31.17 KG/M2 | WEIGHT: 205.69 LBS | OXYGEN SATURATION: 95 % | HEIGHT: 68 IN | DIASTOLIC BLOOD PRESSURE: 67 MMHG | SYSTOLIC BLOOD PRESSURE: 109 MMHG | TEMPERATURE: 99.4 F | HEART RATE: 94 BPM

## 2020-03-18 DIAGNOSIS — J18.9 PNEUMONIA OF LEFT LUNG DUE TO INFECTIOUS ORGANISM, UNSPECIFIED PART OF LUNG: ICD-10-CM

## 2020-03-18 LAB
ANION GAP SERPL CALC-SCNC: 13 MMOL/L (ref 7–16)
BASOPHILS # BLD AUTO: 0.3 % (ref 0–1.8)
BASOPHILS # BLD: 0.02 K/UL (ref 0–0.12)
BUN SERPL-MCNC: 15 MG/DL (ref 8–22)
CALCIUM SERPL-MCNC: 9.6 MG/DL (ref 8.4–10.2)
CHLORIDE SERPL-SCNC: 104 MMOL/L (ref 96–112)
CO2 SERPL-SCNC: 23 MMOL/L (ref 20–33)
CREAT SERPL-MCNC: 0.72 MG/DL (ref 0.5–1.4)
EOSINOPHIL # BLD AUTO: 0.04 K/UL (ref 0–0.51)
EOSINOPHIL NFR BLD: 0.7 % (ref 0–6.9)
ERYTHROCYTE [DISTWIDTH] IN BLOOD BY AUTOMATED COUNT: 48.6 FL (ref 35.9–50)
GLUCOSE SERPL-MCNC: 93 MG/DL (ref 65–99)
HCG SERPL QL: NEGATIVE
HCT VFR BLD AUTO: 46.5 % (ref 37–47)
HGB BLD-MCNC: 14 G/DL (ref 12–16)
IMM GRANULOCYTES # BLD AUTO: 0.01 K/UL (ref 0–0.11)
IMM GRANULOCYTES NFR BLD AUTO: 0.2 % (ref 0–0.9)
LACTATE BLD-SCNC: 1.3 MMOL/L (ref 0.5–2)
LYMPHOCYTES # BLD AUTO: 1.99 K/UL (ref 1–4.8)
LYMPHOCYTES NFR BLD: 33.6 % (ref 22–41)
MCH RBC QN AUTO: 25.4 PG (ref 27–33)
MCHC RBC AUTO-ENTMCNC: 30.1 G/DL (ref 33.6–35)
MCV RBC AUTO: 84.2 FL (ref 81.4–97.8)
MONOCYTES # BLD AUTO: 0.43 K/UL (ref 0–0.85)
MONOCYTES NFR BLD AUTO: 7.3 % (ref 0–13.4)
NEUTROPHILS # BLD AUTO: 3.44 K/UL (ref 2–7.15)
NEUTROPHILS NFR BLD: 57.9 % (ref 44–72)
NRBC # BLD AUTO: 0 K/UL
NRBC BLD-RTO: 0 /100 WBC
PLATELET # BLD AUTO: 173 K/UL (ref 164–446)
PMV BLD AUTO: 12.1 FL (ref 9–12.9)
POTASSIUM SERPL-SCNC: 3.9 MMOL/L (ref 3.6–5.5)
RBC # BLD AUTO: 5.52 M/UL (ref 4.2–5.4)
SODIUM SERPL-SCNC: 140 MMOL/L (ref 135–145)
WBC # BLD AUTO: 5.9 K/UL (ref 4.8–10.8)

## 2020-03-18 PROCEDURE — 71045 X-RAY EXAM CHEST 1 VIEW: CPT

## 2020-03-18 PROCEDURE — 85025 COMPLETE CBC W/AUTO DIFF WBC: CPT

## 2020-03-18 PROCEDURE — 700102 HCHG RX REV CODE 250 W/ 637 OVERRIDE(OP): Performed by: EMERGENCY MEDICINE

## 2020-03-18 PROCEDURE — 84703 CHORIONIC GONADOTROPIN ASSAY: CPT

## 2020-03-18 PROCEDURE — 80048 BASIC METABOLIC PNL TOTAL CA: CPT

## 2020-03-18 PROCEDURE — 99284 EMERGENCY DEPT VISIT MOD MDM: CPT

## 2020-03-18 PROCEDURE — A9270 NON-COVERED ITEM OR SERVICE: HCPCS | Performed by: EMERGENCY MEDICINE

## 2020-03-18 PROCEDURE — 83605 ASSAY OF LACTIC ACID: CPT

## 2020-03-18 RX ORDER — DOXYCYCLINE 100 MG/1
100 CAPSULE ORAL 2 TIMES DAILY
Qty: 20 CAP | Refills: 0 | Status: SHIPPED | OUTPATIENT
Start: 2020-03-18 | End: 2020-03-28

## 2020-03-18 RX ORDER — IBUPROFEN 600 MG/1
600 TABLET ORAL ONCE
Status: COMPLETED | OUTPATIENT
Start: 2020-03-18 | End: 2020-03-18

## 2020-03-18 RX ORDER — ACETAMINOPHEN 325 MG/1
650 TABLET ORAL ONCE
Status: COMPLETED | OUTPATIENT
Start: 2020-03-18 | End: 2020-03-18

## 2020-03-18 RX ADMIN — ACETAMINOPHEN 650 MG: 325 TABLET, FILM COATED ORAL at 15:43

## 2020-03-18 RX ADMIN — IBUPROFEN 600 MG: 600 TABLET ORAL at 15:43

## 2020-03-18 NOTE — DISCHARGE INSTRUCTIONS
Drink plenty of fluids and get lots of rest.    Take over-the-counter Tylenol ibuprofen to help with your discomfort.    At this time we believe your symptoms are secondary to some sort of viral upper respiratory infection.  It is recommended that you self isolate since we do not have any ability to test for influenza or coronavirus at this time for patients that are not critically ill or hospitalized.  You should not go to work until you are completely asymptomatic for at least 2 days.  Your chest x-ray revealed a possible early pneumonia.  Pneumonias can be viral or bacterial.  We suspect that your pneumonia is likely viral, but you will be covered with an antibiotic in the event that there is a bacterial component.    Return to the ER immediately for any worsening cough, coughing of rust colored phlegm or blood, shortness of breath, chest pain, persistent fevers over 101, nausea, vomiting, worsening headache, stiff neck, rash, difficulty swallowing fluids or saliva, or for any concerns.    Follow-up with 1 of the primary care physicians with the [facility] within the next several days.  Please call for appointment.

## 2020-03-18 NOTE — ED PROVIDER NOTES
ED Provider Note  CHIEF COMPLAINT  Chief Complaint   Patient presents with   • Cough     Wet , non productive cough x 2 days.    • Chills     x 2 days, didnt record temp at home, took tylenol this morning, afebrile currently   • Body Aches     x 2 days   • Tachycardia     112 bpm   • Headache     x 2 days        HPI  Marycarmen Retana is a 29 y.o. female who presents to the ER with complaint of sensation of phlegm in throat, cough occasionally productive of some phlegm, chills, intermittent headaches, and subjective fever.  Patient has not taken her temperature at home.  She took some Tylenol this morning.  She is afebrile here in the ER today.  No stiff neck.  No nausea, vomiting or diarrhea.  She works at a bank.  She did not get her flu shot this year.  No known exposures to anybody who was tested positive for coronavirus.  No travel within the last several months.  She has no medical problems.  She denies possibility of pregnancy.  Patient states she has no ill contacts at home.  No chest pains.  No shortness of breath.  No hemoptysis.  No difficulty swallowing fluids or saliva.    REVIEW OF SYSTEMS  See HPI for further details.  Positive for throat discomfort, cough, chills, subjective fever, headache.  Negative for nausea, vomiting, diarrhea, abdominal pain, difficulty swallowing fluids or saliva, shortness of breath, chest pain.  Negative for all other systems are negative.    PAST MEDICAL HISTORY  History reviewed. No pertinent past medical history.  No major medical problems    FAMILY HISTORY  History reviewed. No pertinent family history.    SOCIAL HISTORY  Social History     Socioeconomic History   • Marital status:      Spouse name: Not on file   • Number of children: Not on file   • Years of education: Not on file   • Highest education level: Not on file   Occupational History   • Not on file   Social Needs   • Financial resource strain: Not on file   • Food insecurity     Worry: Not on file      "Inability: Not on file   • Transportation needs     Medical: Not on file     Non-medical: Not on file   Tobacco Use   • Smoking status: Never Smoker   • Smokeless tobacco: Never Used   Substance and Sexual Activity   • Alcohol use: No   • Drug use: No   • Sexual activity: Not on file   Lifestyle   • Physical activity     Days per week: Not on file     Minutes per session: Not on file   • Stress: Not on file   Relationships   • Social connections     Talks on phone: Not on file     Gets together: Not on file     Attends Tenriism service: Not on file     Active member of club or organization: Not on file     Attends meetings of clubs or organizations: Not on file     Relationship status: Not on file   • Intimate partner violence     Fear of current or ex partner: Not on file     Emotionally abused: Not on file     Physically abused: Not on file     Forced sexual activity: Not on file   Other Topics Concern   • Not on file   Social History Narrative   • Not on file       SURGICAL HISTORY  Past Surgical History:   Procedure Laterality Date   • PB  DELIVERY ONLY  2019    Procedure:  SECTION, PRIMARY;  Surgeon: Rabia Cervantes M.D.;  Location: LABOR AND DELIVERY;  Service: Labor and Delivery       CURRENT MEDICATIONS  Home Medications    **Home medications have not yet been reviewed for this encounter**         ALLERGIES  No Known Allergies    PHYSICAL EXAM  VITAL SIGNS: /81   Pulse (!) 115   Temp 37.3 °C (99.2 °F) (Temporal)   Resp 16   Ht 1.727 m (5' 8\")   Wt 93.3 kg (205 lb 11 oz)   SpO2 95%   Breastfeeding No   BMI 31.27 kg/m²      Constitutional: Well developed, well nourished; No acute distress; Non-toxic appearance.   HENT: Normocephalic, atraumatic; Bilateral external ears normal; Oropharynx with moist mucous membranes; No erythema or exudates in the posterior oropharynx.  A few serous fluid bubbles behind the left TM.  Eyes: PERRL, EOMI, Conjunctiva normal. No discharge. "   Neck:  Supple, nontender midline; No stridor; No nuchal rigidity.   Lymphatic: No cervical lymphadenopathy noted.   Cardiovascular: Regular rate and rhythm without murmurs, rubs, or gallop.   Thorax & Lungs: No respiratory distress, breath sounds clear to auscultation bilaterally without wheezing, rales or rhonchi. Nontender chest wall. No crepitus or subcutaneous air  Abdomen: Soft, nontender, bowel sounds normal. No obvious masses; No pulsatile masses; no rebound, guarding, or peritoneal signs.   Skin: Good color; warm and dry without rash or petechia.  Back: Nontender, No CVA tenderness.   Extremities:  No edema; Nontender calves or saphenous, No cyanosis, No clubbing.   Musculoskeletal: Good range of motion in all major joints. No tenderness to palpation or major deformities noted.   Neurologic: Alert & oriented x 4, clear speech      RADIOLOGY/PROCEDURES  DX-CHEST-PORTABLE (1 VIEW)   Final Result      Patchy opacity within the left mid lung possibly representing early pneumonia.          COURSE & MEDICAL DECISION MAKING  Pertinent Labs & Imaging studies reviewed. (See chart for details)  Results for orders placed or performed during the hospital encounter of 03/18/20   CBC WITH DIFFERENTIAL   Result Value Ref Range    WBC 5.9 4.8 - 10.8 K/uL    RBC 5.52 (H) 4.20 - 5.40 M/uL    Hemoglobin 14.0 12.0 - 16.0 g/dL    Hematocrit 46.5 37.0 - 47.0 %    MCV 84.2 81.4 - 97.8 fL    MCH 25.4 (L) 27.0 - 33.0 pg    MCHC 30.1 (L) 33.6 - 35.0 g/dL    RDW 48.6 35.9 - 50.0 fL    Platelet Count 173 164 - 446 K/uL    MPV 12.1 9.0 - 12.9 fL    Neutrophils-Polys 57.90 44.00 - 72.00 %    Lymphocytes 33.60 22.00 - 41.00 %    Monocytes 7.30 0.00 - 13.40 %    Eosinophils 0.70 0.00 - 6.90 %    Basophils 0.30 0.00 - 1.80 %    Immature Granulocytes 0.20 0.00 - 0.90 %    Nucleated RBC 0.00 /100 WBC    Neutrophils (Absolute) 3.44 2.00 - 7.15 K/uL    Lymphs (Absolute) 1.99 1.00 - 4.80 K/uL    Monos (Absolute) 0.43 0.00 - 0.85 K/uL    Eos  "(Absolute) 0.04 0.00 - 0.51 K/uL    Baso (Absolute) 0.02 0.00 - 0.12 K/uL    Immature Granulocytes (abs) 0.01 0.00 - 0.11 K/uL    NRBC (Absolute) 0.00 K/uL   HCG QUAL SERUM   Result Value Ref Range    Beta-Hcg Qualitative Serum Negative Negative   LACTIC ACID   Result Value Ref Range    Lactic Acid 1.3 0.5 - 2.0 mmol/L         Patient presents to the ER complaining of throat discomfort which feels like \"mucus is stuck in her throat\", cough occasionally productive of some phlegm, chills, subjective fever, and intermittent headaches over the last 2 days.  She states his symptoms initially began as a feeling of throat discomfort and some phlegm stuck in her throat.  She then started coughing.  No coughing of blood.  She does not feel short of breath.  No chest pain.  She presents to the ER with a slight elevation in heart rate.  She was tachycardic in the 115 range upon arrival.  Temperature was 100.0 Fahrenheit.  She was given Tylenol and ibuprofen and heart rate is now in the mid 90s.  Patient did not get her flu shot this year.  No known exposures to anybody who tested positive for coronavirus.  No foreign travel or travel to any endemic domestic hotspots for coronavirus within the last 2 months.  The patient is young and healthy.  She is not pregnant.  I explained to her that communication with your physicians and [facility] staff today indicates that we are not testing for influenza and we are not testing for coronavirus unless the patient is a admitted/hospitalized patient.  Unfortunately there is a lack of testing media at this time.  Patient does not meet criteria for testing as she does not need to be hospitalized.  She understands that she is ill with viral respiratory symptoms and therefore it is possible she could have coronavirus.  Is possible she could have flu.  She is not immunocompromised and she is not pregnant.  Therefore Tamiflu is not recommended.  I did, however, get a chest x-ray given her " tachycardia and fever and cough.  Chest x-ray shows what appears to be a patchy left middle lobe infiltrate.  Radiologist feels this could represent an early pneumonia.  Given patient's symptoms and normal white blood cell count, I suspect this could be a viral pneumonia.  However, I will treat the patient with doxycycline for 10 days for a possible pneumonia.  I have instructed the patient to self quarantine.  She is not to return to work until she is asymptomatic for 2 days.  She is to social distance and avoid contact with the public.  She was concerned about her 3 children at home.  I told her that her children should also be sheltering in place per the governor's recommendations and all family members should avoid contact with the public.  She has been given very strict return precautions and discharge instructions.  She understands if she gets worse in any way she must return to the ER immediately.  Patient is to treat her symptoms with OTC antipyretics.  She is to drink lots of fluids and get lots of rest.  Patient understands treatment plan and follow-up.    HYDRATION: Based on the patient's presentation of Tachycardia the patient was given IV fluids. IV Hydration was used because oral hydration was not as rapid as required. Upon recheck following hydration, the patient was improved.      Disposition: To home in stable and improved condition.    FINAL IMPRESSION  1. Pneumonia of left lung due to infectious organism, unspecified part of lung Acute         This dictation has been created using voice recognition software. The accuracy of the dictation is limited by the abilities of the software. I expect there may be some errors of grammar and possibly content. I made every attempt to manually correct the errors within my dictation. However, errors related to voice recognition software may still exist and should be interpreted within the appropriate context.    Electronically signed by: Brea Cameron M.D.,  3/18/2020 2:27 PM

## 2020-03-18 NOTE — ED NOTES
Pt discharged, reviewed all discharge instructions including medications and follow up, pt verbalizes understanding, and denies questions. Prescription given to pt.  Escorted to lobby.

## 2020-03-18 NOTE — ED TRIAGE NOTES
Chief Complaint   Patient presents with   • Cough     Wet , non productive cough x 2 days.    • Chills     x 2 days, didnt record temp at home, took tylenol this morning, afebrile currently   • Body Aches     x 2 days   • Tachycardia     112 bpm   • Headache     x 2 days    Pt does not meet criteria 1,2 or 3 for covid screening.

## 2023-07-18 ENCOUNTER — APPOINTMENT (OUTPATIENT)
Dept: RADIOLOGY | Facility: MEDICAL CENTER | Age: 33
End: 2023-07-18
Attending: EMERGENCY MEDICINE
Payer: COMMERCIAL

## 2023-07-18 ENCOUNTER — HOSPITAL ENCOUNTER (EMERGENCY)
Facility: MEDICAL CENTER | Age: 33
End: 2023-07-18
Attending: EMERGENCY MEDICINE
Payer: COMMERCIAL

## 2023-07-18 VITALS
HEART RATE: 85 BPM | WEIGHT: 161.82 LBS | OXYGEN SATURATION: 96 % | BODY MASS INDEX: 24.52 KG/M2 | DIASTOLIC BLOOD PRESSURE: 77 MMHG | TEMPERATURE: 98.1 F | SYSTOLIC BLOOD PRESSURE: 107 MMHG | HEIGHT: 68 IN | RESPIRATION RATE: 18 BRPM

## 2023-07-18 DIAGNOSIS — R10.84 GENERALIZED ABDOMINAL PAIN: ICD-10-CM

## 2023-07-18 LAB
ALBUMIN SERPL BCP-MCNC: 4.2 G/DL (ref 3.2–4.9)
ALBUMIN/GLOB SERPL: 1.6 G/DL
ALP SERPL-CCNC: 70 U/L (ref 30–99)
ALT SERPL-CCNC: 31 U/L (ref 2–50)
ANION GAP SERPL CALC-SCNC: 10 MMOL/L (ref 7–16)
APPEARANCE UR: CLEAR
AST SERPL-CCNC: 21 U/L (ref 12–45)
BACTERIA #/AREA URNS HPF: ABNORMAL /HPF
BASOPHILS # BLD AUTO: 0.5 % (ref 0–1.8)
BASOPHILS # BLD: 0.03 K/UL (ref 0–0.12)
BILIRUB SERPL-MCNC: 0.4 MG/DL (ref 0.1–1.5)
BILIRUB UR QL STRIP.AUTO: NEGATIVE
BUN SERPL-MCNC: 12 MG/DL (ref 8–22)
CALCIUM ALBUM COR SERPL-MCNC: 9.4 MG/DL (ref 8.5–10.5)
CALCIUM SERPL-MCNC: 9.6 MG/DL (ref 8.4–10.2)
CHLORIDE SERPL-SCNC: 104 MMOL/L (ref 96–112)
CO2 SERPL-SCNC: 24 MMOL/L (ref 20–33)
COLOR UR: YELLOW
CREAT SERPL-MCNC: 0.78 MG/DL (ref 0.5–1.4)
EOSINOPHIL # BLD AUTO: 0.03 K/UL (ref 0–0.51)
EOSINOPHIL NFR BLD: 0.5 % (ref 0–6.9)
EPI CELLS #/AREA URNS HPF: ABNORMAL /HPF
ERYTHROCYTE [DISTWIDTH] IN BLOOD BY AUTOMATED COUNT: 44 FL (ref 35.9–50)
GFR SERPLBLD CREATININE-BSD FMLA CKD-EPI: 104 ML/MIN/1.73 M 2
GLOBULIN SER CALC-MCNC: 2.6 G/DL (ref 1.9–3.5)
GLUCOSE SERPL-MCNC: 126 MG/DL (ref 65–99)
GLUCOSE UR STRIP.AUTO-MCNC: NEGATIVE MG/DL
HCG SERPL QL: NEGATIVE
HCT VFR BLD AUTO: 46.9 % (ref 37–47)
HGB BLD-MCNC: 15 G/DL (ref 12–16)
IMM GRANULOCYTES # BLD AUTO: 0.02 K/UL (ref 0–0.11)
IMM GRANULOCYTES NFR BLD AUTO: 0.3 % (ref 0–0.9)
KETONES UR STRIP.AUTO-MCNC: NEGATIVE MG/DL
LEUKOCYTE ESTERASE UR QL STRIP.AUTO: ABNORMAL
LIPASE SERPL-CCNC: 38 U/L (ref 7–58)
LYMPHOCYTES # BLD AUTO: 1.96 K/UL (ref 1–4.8)
LYMPHOCYTES NFR BLD: 30.1 % (ref 22–41)
MCH RBC QN AUTO: 28.2 PG (ref 27–33)
MCHC RBC AUTO-ENTMCNC: 32 G/DL (ref 32.2–35.5)
MCV RBC AUTO: 88.3 FL (ref 81.4–97.8)
MICRO URNS: ABNORMAL
MONOCYTES # BLD AUTO: 0.21 K/UL (ref 0–0.85)
MONOCYTES NFR BLD AUTO: 3.2 % (ref 0–13.4)
NEUTROPHILS # BLD AUTO: 4.27 K/UL (ref 1.82–7.42)
NEUTROPHILS NFR BLD: 65.4 % (ref 44–72)
NITRITE UR QL STRIP.AUTO: NEGATIVE
NRBC # BLD AUTO: 0.02 K/UL
NRBC BLD-RTO: 0.3 /100 WBC (ref 0–0.2)
PH UR STRIP.AUTO: 5.5 [PH] (ref 5–8)
PLATELET # BLD AUTO: 208 K/UL (ref 164–446)
PMV BLD AUTO: 11.4 FL (ref 9–12.9)
POTASSIUM SERPL-SCNC: 3.8 MMOL/L (ref 3.6–5.5)
PROT SERPL-MCNC: 6.8 G/DL (ref 6–8.2)
PROT UR QL STRIP: NEGATIVE MG/DL
RBC # BLD AUTO: 5.31 M/UL (ref 4.2–5.4)
RBC # URNS HPF: ABNORMAL /HPF
RBC UR QL AUTO: NEGATIVE
SODIUM SERPL-SCNC: 138 MMOL/L (ref 135–145)
SP GR UR STRIP.AUTO: 1.02
WBC # BLD AUTO: 6.5 K/UL (ref 4.8–10.8)
WBC #/AREA URNS HPF: ABNORMAL /HPF

## 2023-07-18 PROCEDURE — 99284 EMERGENCY DEPT VISIT MOD MDM: CPT

## 2023-07-18 PROCEDURE — 81001 URINALYSIS AUTO W/SCOPE: CPT

## 2023-07-18 PROCEDURE — 85025 COMPLETE CBC W/AUTO DIFF WBC: CPT

## 2023-07-18 PROCEDURE — 84703 CHORIONIC GONADOTROPIN ASSAY: CPT

## 2023-07-18 PROCEDURE — 80053 COMPREHEN METABOLIC PANEL: CPT

## 2023-07-18 PROCEDURE — 36415 COLL VENOUS BLD VENIPUNCTURE: CPT

## 2023-07-18 PROCEDURE — 83690 ASSAY OF LIPASE: CPT

## 2023-07-18 RX ORDER — ONDANSETRON 2 MG/ML
4 INJECTION INTRAMUSCULAR; INTRAVENOUS ONCE
Status: DISCONTINUED | OUTPATIENT
Start: 2023-07-18 | End: 2023-07-18 | Stop reason: HOSPADM

## 2023-07-18 RX ORDER — KETOROLAC TROMETHAMINE 30 MG/ML
30 INJECTION, SOLUTION INTRAMUSCULAR; INTRAVENOUS ONCE
Status: DISCONTINUED | OUTPATIENT
Start: 2023-07-18 | End: 2023-07-18 | Stop reason: HOSPADM

## 2023-07-18 NOTE — ED TRIAGE NOTES
Pt comes in w/ mother  c/o severe abdomen pain  that started this morning   continues to get worse even after taking her GI med anay   has had surgery to abdomen (gastric sleeve 2014) and has had pain somewhat like this however this is worse  pain making pt nauseous

## 2023-07-19 NOTE — ED PROVIDER NOTES
ER Provider Note    Scribed for Burke Mcgarry D.O. by Daphney Mcadams. 2023  6:09 PM    Primary Care Provider: Pcp Pt States None    CHIEF COMPLAINT  Chief Complaint   Patient presents with    Abdominal Pain     Started this morning   not getting better after taking her Linzess     Nausea     Nausea from the pain        HPI/ROS  LIMITATION TO HISTORY   None    OUTSIDE HISTORIAN(S)  None    Marycarmen Retana is a 32 y.o. female who presents to the Emergency Department for abdominal pain onset this morning. She describes having a sharp sensation in her epigastric region this morning that then traveled down her abdomen. She initially thought that the pain was due to gas so she took two Gas-X pills and a laxative at home with no relief. Her pain is exacerbated when standing up. She has never had pain this severe before. She had a non-complicated gastric sleeve surgery in . She also experienced nausea earlier today when her pain was more severe, but no vomiting.     ROS as per HPI.    PAST MEDICAL HISTORY  History reviewed. No pertinent past medical history.    SURGICAL HISTORY  Past Surgical History:   Procedure Laterality Date    SD  DELIVERY ONLY  2019    Procedure:  SECTION, PRIMARY;  Surgeon: Rabia Cervantes M.D.;  Location: LABOR AND DELIVERY;  Service: Labor and Delivery       FAMILY HISTORY  History reviewed. No pertinent family history.    SOCIAL HISTORY   reports that she has never smoked. She has never used smokeless tobacco. She reports that she does not drink alcohol and does not use drugs.    CURRENT MEDICATIONS  Previous Medications    IBUPROFEN (MOTRIN) 600 MG TAB    Take 1 Tab by mouth every 6 hours as needed (For cramping after delivery; do not give if patient is receiving ketorolac (Toradol)).    PRENATAL MV-MIN-FE FUM-FA-DHA (PRENATAL 1 PO)    Take  by mouth.       ALLERGIES  Patient has no known allergies.    PHYSICAL EXAM  /77   Pulse 85   Temp 36.7 °C (98.1 °F)  "(Temporal)   Resp 18   Ht 1.727 m (5' 8\")   Wt 73.4 kg (161 lb 13.1 oz)   SpO2 96%   BMI 24.60 kg/m²     General: No acute distress.  HENT: Normocephalic, Mucus membranes are moist.   Chest: Lungs have even and unlabored respirations, Clear to auscultation.   Cardiovascular: Regular rate and regular rhythm, No peripheral cyanosis.  Abdomen: Non distended. Moderate epigastric tendernesss.  Neuro: Awake, Conversive, Able to relay recent events.  Psychiatric: Calm and cooperative.     EXTERNAL RECORDS REVIEWED  Review of patient's past medical records show no recent ER visits.     INITIAL ASSESSMENT  Patient presents with epigastric pain which is concerning for gastritis, pancreatitis, and appendicitis. We will evaluate with labs and CT and treat for pain.    ED Observation Status? Yes; I am placing the patient in to an observation status due to a diagnostic uncertainty as well as therapeutic intensity. Patient placed in observation status at 6:13 PM, 7/18/2023.     Observation plan is as follows: We will treat for pain pending evaluation results.      Upon Reevaluation, the patient's condition has: Improved; and will be discharged.    Patient discharged from ED Observation status at 7:38 PM (Time) 7/18/2023 (Date).     DIAGNOSTIC STUDIES    Labs:   Results for orders placed or performed during the hospital encounter of 07/18/23   CBC WITH DIFFERENTIAL   Result Value Ref Range    WBC 6.5 4.8 - 10.8 K/uL    RBC 5.31 4.20 - 5.40 M/uL    Hemoglobin 15.0 12.0 - 16.0 g/dL    Hematocrit 46.9 37.0 - 47.0 %    MCV 88.3 81.4 - 97.8 fL    MCH 28.2 27.0 - 33.0 pg    MCHC 32.0 (L) 32.2 - 35.5 g/dL    RDW 44.0 35.9 - 50.0 fL    Platelet Count 208 164 - 446 K/uL    MPV 11.4 9.0 - 12.9 fL    Neutrophils-Polys 65.40 44.00 - 72.00 %    Lymphocytes 30.10 22.00 - 41.00 %    Monocytes 3.20 0.00 - 13.40 %    Eosinophils 0.50 0.00 - 6.90 %    Basophils 0.50 0.00 - 1.80 %    Immature Granulocytes 0.30 0.00 - 0.90 %    Nucleated RBC 0.30 " (H) 0.00 - 0.20 /100 WBC    Neutrophils (Absolute) 4.27 1.82 - 7.42 K/uL    Lymphs (Absolute) 1.96 1.00 - 4.80 K/uL    Monos (Absolute) 0.21 0.00 - 0.85 K/uL    Eos (Absolute) 0.03 0.00 - 0.51 K/uL    Baso (Absolute) 0.03 0.00 - 0.12 K/uL    Immature Granulocytes (abs) 0.02 0.00 - 0.11 K/uL    NRBC (Absolute) 0.02 K/uL   COMP METABOLIC PANEL   Result Value Ref Range    Sodium 138 135 - 145 mmol/L    Potassium 3.8 3.6 - 5.5 mmol/L    Chloride 104 96 - 112 mmol/L    Co2 24 20 - 33 mmol/L    Anion Gap 10.0 7.0 - 16.0    Glucose 126 (H) 65 - 99 mg/dL    Bun 12 8 - 22 mg/dL    Creatinine 0.78 0.50 - 1.40 mg/dL    Calcium 9.6 8.4 - 10.2 mg/dL    AST(SGOT) 21 12 - 45 U/L    ALT(SGPT) 31 2 - 50 U/L    Alkaline Phosphatase 70 30 - 99 U/L    Total Bilirubin 0.4 0.1 - 1.5 mg/dL    Albumin 4.2 3.2 - 4.9 g/dL    Total Protein 6.8 6.0 - 8.2 g/dL    Globulin 2.6 1.9 - 3.5 g/dL    A-G Ratio 1.6 g/dL   LIPASE   Result Value Ref Range    Lipase 38 7 - 58 U/L   URINALYSIS,CULTURE IF INDICATED    Specimen: Urine   Result Value Ref Range    Color Yellow     Character Clear     Specific Gravity 1.020 <1.035    Ph 5.5 5.0 - 8.0    Glucose Negative Negative mg/dL    Ketones Negative Negative mg/dL    Protein Negative Negative mg/dL    Bilirubin Negative Negative    Nitrite Negative Negative    Leukocyte Esterase Trace (A) Negative    Occult Blood Negative Negative    Micro Urine Req Microscopic    HCG QUAL SERUM   Result Value Ref Range    Beta-Hcg Qualitative Serum Negative Negative   URINE MICROSCOPIC (W/UA)   Result Value Ref Range    WBC 2-5 /hpf    RBC 0-2 /hpf    Bacteria Rare (A) None /hpf    Epithelial Cells Few Few /hpf   CORRECTED CALCIUM   Result Value Ref Range    Correct Calcium 9.4 8.5 - 10.5 mg/dL   ESTIMATED GFR   Result Value Ref Range    GFR (CKD-EPI) 104 >60 mL/min/1.73 m 2      All labs reviewed by me.      COURSE & MEDICAL DECISION MAKING     COURSE AND PLAN  6:09 PM - Patient seen and examined at bedside. Discussed  plan of care, including labs and imaging. Patient agrees to the plan of care. The patient will be medicated with GI Cocktail, Zofran 4 mg injection, and Toradol 30 mg injection. Ordered for CT-Abdomen Pelvis with contrast, UA, Lipase, CMP, CBC w/ Diff, Estimated GFR, Corrected Calcium, and Urine Microscopic to evaluate her symptoms.     6:47 PM - RN informed me that the patient is feeling better and does not want to get the CT scan done and refused medications. Patient was reevaluated at bedside and she explains that her pain has resolved without interventions. She prefers to not get the CT scan done at this time. Patient had the opportunity to ask any questions. The plan for discharge was discussed with them and she was told to return for any new or worsening symptoms. She was also informed of the plans for follow up. Patient is understanding and agreeable to the plan for discharge.     ED Summary: Patient presented with midepigastric pain that radiated down her leg.  The pain has been improving.  While she was here she did have a bit of mid epigastric tenderness.  She went up to use the restroom, passed some gas and felt gurgling and her pain is resolved.  With that her labs are normal, CT is negative the patient is stable for discharge home with instructions to return if her pain returns.    DISPOSITION AND DISCUSSIONS  I have discussed management of the patient with the following physicians and ANUP's: None    Discussion of management with other QHP or appropriate source(s): None    Barriers to care at this time, including but not limited to: Patient not established with PCP.      The patient will return for new or worsening symptoms and is stable at the time of discharge.    The patient is referred to a primary physician for blood pressure management, diabetic screening, and for all other preventative health concerns.    DISPOSITION:  Patient will be discharged home in stable condition.    FOLLOW UP:  [facility]  scheduling  Please call [phone] to make an appoint with a next available practitioner for follow-up  In 1 week      FINAL DIAGNOSIS  1. Generalized abdominal pain      IDaphney (Jonathanibjose alfredo), am scribing for, and in the presence of, Burke Mcgarry D.O..    Electronically signed by: Daphney Mcadams (Kiley), 7/18/2023    IBruke D.O. personally performed the services described in this documentation, as scribed by Daphney Mcadams in my presence, and it is both accurate and complete.     The note accurately reflects work and decisions made by me.  Burke Mcgarry D.O.  7/18/2023  10:00 PM

## 2023-07-19 NOTE — DISCHARGE INSTRUCTIONS
At this time I do not know what this pain is from.  Your blood tests are normal.  As your pain is resolved and you would prefer not to have a CAT scan at this time you are discharged home.  If your pain returns or there are any other concerns or changes return directly to the emergency room.

## 2023-07-19 NOTE — ED NOTES
Went to medicate pt and she refused all meds. States she feels much better. Pt going to ambulate to make sure pain doesn't return

## 2023-07-19 NOTE — ED NOTES
Assisted w/ discharge.  Reviewed discharged instructions w/ pt, verbalized understanding to information provided including follow up care w/ PCP and return precautions, denied questions/concerns.  Pt ambulated from ED w/ visitor.

## (undated) DEVICE — ELECTRODE DUAL RETURN W/ CORD - (50/PK)

## (undated) DEVICE — SODIUM CHL IRRIGATION 0.9% 1000ML (12EA/CA)

## (undated) DEVICE — STAPLER SKIN DISP - (6/BX 10BX/CA) VISISTAT

## (undated) DEVICE — HEAD HOLDER JUNIOR/ADULT

## (undated) DEVICE — PACK C-SECTION (2EA/CA)

## (undated) DEVICE — TUBING CLEARLINK DUO-VENT - C-FLO (48EA/CA)

## (undated) DEVICE — CATHETER IV NON-SAFETY 18 GA X 1 1/4 (50/BX 4BX/CA)

## (undated) DEVICE — SET EXTENSION WITH 2 PORTS (48EA/CA) ***PART #2C8610 IS A SUBSTITUTE*****

## (undated) DEVICE — SUTURE 1 VICRYL PLUS CTX - 36 INCH (36/BX)

## (undated) DEVICE — RETRACTOR O C SECTION LRY - (5/BX)

## (undated) DEVICE — GLOVE BIOGEL SZ 7.5 SURGICAL PF LTX - (50PR/BX 4BX/CA)

## (undated) DEVICE — DETERGENT RENUZYME PLUS 10 OZ PACKET (50/BX)

## (undated) DEVICE — RETRACTOR O C SECTION X-LRY - (5/BX)

## (undated) DEVICE — SUTURE 1 VICRYL PLUSCT-1 27IN - (36/BX)

## (undated) DEVICE — TRAY SPINAL ANESTHESIA NON-SAFETY (10/CA)

## (undated) DEVICE — SUTURE 3-0 VICRYL PLUS CT-1 - 36 INCH (36/BX)

## (undated) DEVICE — KIT  I.V. START (100EA/CA)

## (undated) DEVICE — WATER IRRIG. STER. 1500 ML - (9/CA)